# Patient Record
Sex: FEMALE | Race: WHITE | NOT HISPANIC OR LATINO | Employment: FULL TIME | URBAN - METROPOLITAN AREA
[De-identification: names, ages, dates, MRNs, and addresses within clinical notes are randomized per-mention and may not be internally consistent; named-entity substitution may affect disease eponyms.]

---

## 2019-08-16 ENCOUNTER — TRANSCRIBE ORDERS (OUTPATIENT)
Dept: ADMINISTRATIVE | Facility: HOSPITAL | Age: 29
End: 2019-08-16

## 2019-08-16 DIAGNOSIS — R10.11 RIGHT UPPER QUADRANT ABDOMINAL PAIN: Primary | ICD-10-CM

## 2019-08-22 ENCOUNTER — HOSPITAL ENCOUNTER (OUTPATIENT)
Dept: ULTRASOUND IMAGING | Facility: HOSPITAL | Age: 29
Discharge: HOME | End: 2019-08-22
Admitting: NURSE PRACTITIONER

## 2019-08-22 ENCOUNTER — HOSPITAL ENCOUNTER (OUTPATIENT)
Dept: NUCLEAR MEDICINE | Facility: HOSPITAL | Age: 29
Discharge: HOME | End: 2019-08-22

## 2019-08-22 DIAGNOSIS — R10.11 RIGHT UPPER QUADRANT ABDOMINAL PAIN: ICD-10-CM

## 2019-08-22 PROCEDURE — 0 TECHNETIUM SESTAMIBI: Performed by: NURSE PRACTITIONER

## 2019-08-22 PROCEDURE — 76705 ECHO EXAM OF ABDOMEN: CPT

## 2019-08-22 PROCEDURE — A9500 TC99M SESTAMIBI: HCPCS | Performed by: NURSE PRACTITIONER

## 2019-08-22 PROCEDURE — 78227 HEPATOBIL SYST IMAGE W/DRUG: CPT

## 2019-08-22 RX ADMIN — TECHNETIUM TC 99M SESTAMIBI 1 DOSE: 1 INJECTION INTRAVENOUS at 09:10

## 2019-08-29 ENCOUNTER — OFFICE (AMBULATORY)
Dept: URBAN - METROPOLITAN AREA CLINIC 64 | Facility: CLINIC | Age: 29
End: 2019-08-29

## 2019-08-29 VITALS
SYSTOLIC BLOOD PRESSURE: 115 MMHG | HEIGHT: 65 IN | HEART RATE: 80 BPM | WEIGHT: 178 LBS | DIASTOLIC BLOOD PRESSURE: 85 MMHG

## 2019-08-29 DIAGNOSIS — K21.9 GASTRO-ESOPHAGEAL REFLUX DISEASE WITHOUT ESOPHAGITIS: ICD-10-CM

## 2019-08-29 DIAGNOSIS — R10.11 RIGHT UPPER QUADRANT PAIN: ICD-10-CM

## 2019-08-29 DIAGNOSIS — R11.0 NAUSEA: ICD-10-CM

## 2019-08-29 DIAGNOSIS — R19.8 OTHER SPECIFIED SYMPTOMS AND SIGNS INVOLVING THE DIGESTIVE S: ICD-10-CM

## 2019-08-29 PROCEDURE — 99243 OFF/OP CNSLTJ NEW/EST LOW 30: CPT | Performed by: NURSE PRACTITIONER

## 2019-08-29 RX ORDER — SUCRALFATE 1 G/1
4 TABLET ORAL
Qty: 120 | Refills: 11 | Status: ACTIVE
Start: 2019-08-29

## 2019-09-06 ENCOUNTER — OFFICE (AMBULATORY)
Dept: URBAN - METROPOLITAN AREA PATHOLOGY 4 | Facility: PATHOLOGY | Age: 29
End: 2019-09-06

## 2019-09-06 ENCOUNTER — ON CAMPUS - OUTPATIENT (AMBULATORY)
Dept: URBAN - METROPOLITAN AREA HOSPITAL 2 | Facility: HOSPITAL | Age: 29
End: 2019-09-06

## 2019-09-06 VITALS
HEART RATE: 91 BPM | SYSTOLIC BLOOD PRESSURE: 136 MMHG | RESPIRATION RATE: 18 BRPM | DIASTOLIC BLOOD PRESSURE: 77 MMHG | OXYGEN SATURATION: 96 % | HEART RATE: 68 BPM | HEART RATE: 61 BPM | TEMPERATURE: 97.6 F | OXYGEN SATURATION: 97 % | OXYGEN SATURATION: 100 % | DIASTOLIC BLOOD PRESSURE: 78 MMHG | DIASTOLIC BLOOD PRESSURE: 84 MMHG | SYSTOLIC BLOOD PRESSURE: 144 MMHG | WEIGHT: 170 LBS | SYSTOLIC BLOOD PRESSURE: 115 MMHG | RESPIRATION RATE: 16 BRPM | HEART RATE: 73 BPM | DIASTOLIC BLOOD PRESSURE: 86 MMHG | SYSTOLIC BLOOD PRESSURE: 124 MMHG | DIASTOLIC BLOOD PRESSURE: 79 MMHG | SYSTOLIC BLOOD PRESSURE: 123 MMHG | HEIGHT: 64 IN

## 2019-09-06 DIAGNOSIS — K29.70 GASTRITIS, UNSPECIFIED, WITHOUT BLEEDING: ICD-10-CM

## 2019-09-06 DIAGNOSIS — R11.0 NAUSEA: ICD-10-CM

## 2019-09-06 DIAGNOSIS — K21.9 GASTRO-ESOPHAGEAL REFLUX DISEASE WITHOUT ESOPHAGITIS: ICD-10-CM

## 2019-09-06 DIAGNOSIS — R10.11 RIGHT UPPER QUADRANT PAIN: ICD-10-CM

## 2019-09-06 DIAGNOSIS — K29.50 UNSPECIFIED CHRONIC GASTRITIS WITHOUT BLEEDING: ICD-10-CM

## 2019-09-06 LAB
GI HISTOLOGY: A. SELECT: (no result)
GI HISTOLOGY: B. UNSPECIFIED: (no result)
GI HISTOLOGY: PDF REPORT: (no result)

## 2019-09-06 PROCEDURE — 88305 TISSUE EXAM BY PATHOLOGIST: CPT | Performed by: INTERNAL MEDICINE

## 2019-09-06 PROCEDURE — 88342 IMHCHEM/IMCYTCHM 1ST ANTB: CPT | Performed by: INTERNAL MEDICINE

## 2019-09-06 PROCEDURE — 43239 EGD BIOPSY SINGLE/MULTIPLE: CPT | Performed by: INTERNAL MEDICINE

## 2019-09-06 NOTE — SERVICEHPINOTES
FELICIA MONTOYA  is a  28  female   who presents today for a  EGD   for   the indications listed below. The updated Patient Profile was reviewed prior to the procedure, in conjunction with the Physical Exam, including medical conditions, surgical procedures, medications, allergies, family history and social history. See Physical Exam time stamp below for date and time of HPI completion.Pre-operatively, I reviewed the indication(s) for the procedure, the risks of the procedure [including but not limited to: unexpected bleeding possibly requiring hospitalization and/or unplanned repeat procedures, perforation possibly requiring surgical treatment, missed lesions and complications of sedation/MAC (also explained by anesthesia staff)]. I have evaluated the patient for risks associated with the planned anesthesia and the procedure to be performed and find the patient an acceptable candidate for IV sedation.Multiple opportunities were provided for any questions or concerns, and all questions were answered satisfactorily before any anesthesia was administered. We will proceed with the planned procedure.BR

## 2019-11-19 ENCOUNTER — OFFICE (AMBULATORY)
Dept: URBAN - METROPOLITAN AREA CLINIC 64 | Facility: CLINIC | Age: 29
End: 2019-11-19

## 2019-11-19 VITALS
HEART RATE: 90 BPM | DIASTOLIC BLOOD PRESSURE: 99 MMHG | WEIGHT: 194 LBS | HEIGHT: 64 IN | SYSTOLIC BLOOD PRESSURE: 139 MMHG

## 2019-11-19 DIAGNOSIS — K29.70 GASTRITIS, UNSPECIFIED, WITHOUT BLEEDING: ICD-10-CM

## 2019-11-19 DIAGNOSIS — R19.8 OTHER SPECIFIED SYMPTOMS AND SIGNS INVOLVING THE DIGESTIVE S: ICD-10-CM

## 2019-11-19 PROCEDURE — 99213 OFFICE O/P EST LOW 20 MIN: CPT | Performed by: NURSE PRACTITIONER

## 2020-12-07 ENCOUNTER — OFFICE (AMBULATORY)
Dept: URBAN - METROPOLITAN AREA CLINIC 64 | Facility: CLINIC | Age: 30
End: 2020-12-07

## 2020-12-07 VITALS
SYSTOLIC BLOOD PRESSURE: 132 MMHG | HEIGHT: 64 IN | WEIGHT: 202 LBS | HEART RATE: 82 BPM | DIASTOLIC BLOOD PRESSURE: 84 MMHG

## 2020-12-07 DIAGNOSIS — K59.00 CONSTIPATION, UNSPECIFIED: ICD-10-CM

## 2020-12-07 DIAGNOSIS — R10.11 RIGHT UPPER QUADRANT PAIN: ICD-10-CM

## 2020-12-07 DIAGNOSIS — R13.10 DYSPHAGIA, UNSPECIFIED: ICD-10-CM

## 2020-12-07 DIAGNOSIS — R11.0 NAUSEA: ICD-10-CM

## 2020-12-07 DIAGNOSIS — K21.9 GASTRO-ESOPHAGEAL REFLUX DISEASE WITHOUT ESOPHAGITIS: ICD-10-CM

## 2020-12-07 DIAGNOSIS — R10.84 GENERALIZED ABDOMINAL PAIN: ICD-10-CM

## 2020-12-07 PROCEDURE — 99213 OFFICE O/P EST LOW 20 MIN: CPT | Performed by: NURSE PRACTITIONER

## 2020-12-07 RX ORDER — PANTOPRAZOLE 40 MG/1
TABLET, DELAYED RELEASE ORAL
Qty: 90 | Refills: 3 | Status: ACTIVE

## 2020-12-10 ENCOUNTER — OFFICE (AMBULATORY)
Dept: URBAN - METROPOLITAN AREA PATHOLOGY 4 | Facility: PATHOLOGY | Age: 30
End: 2020-12-10

## 2020-12-10 ENCOUNTER — ON CAMPUS - OUTPATIENT (AMBULATORY)
Dept: URBAN - METROPOLITAN AREA HOSPITAL 2 | Facility: HOSPITAL | Age: 30
End: 2020-12-10
Payer: COMMERCIAL

## 2020-12-10 VITALS
HEIGHT: 64 IN | TEMPERATURE: 97.8 F | HEART RATE: 67 BPM | DIASTOLIC BLOOD PRESSURE: 79 MMHG | DIASTOLIC BLOOD PRESSURE: 76 MMHG | DIASTOLIC BLOOD PRESSURE: 88 MMHG | WEIGHT: 201 LBS | RESPIRATION RATE: 16 BRPM | SYSTOLIC BLOOD PRESSURE: 142 MMHG | HEART RATE: 69 BPM | SYSTOLIC BLOOD PRESSURE: 112 MMHG | SYSTOLIC BLOOD PRESSURE: 117 MMHG | SYSTOLIC BLOOD PRESSURE: 111 MMHG | SYSTOLIC BLOOD PRESSURE: 110 MMHG | SYSTOLIC BLOOD PRESSURE: 103 MMHG | HEART RATE: 73 BPM | OXYGEN SATURATION: 100 % | DIASTOLIC BLOOD PRESSURE: 68 MMHG | DIASTOLIC BLOOD PRESSURE: 85 MMHG | OXYGEN SATURATION: 99 % | RESPIRATION RATE: 18 BRPM | SYSTOLIC BLOOD PRESSURE: 121 MMHG | HEART RATE: 76 BPM | HEART RATE: 66 BPM | HEART RATE: 72 BPM | HEART RATE: 74 BPM | RESPIRATION RATE: 15 BRPM | DIASTOLIC BLOOD PRESSURE: 59 MMHG

## 2020-12-10 DIAGNOSIS — R11.0 NAUSEA: ICD-10-CM

## 2020-12-10 DIAGNOSIS — K21.9 GASTRO-ESOPHAGEAL REFLUX DISEASE WITHOUT ESOPHAGITIS: ICD-10-CM

## 2020-12-10 DIAGNOSIS — K62.1 RECTAL POLYP: ICD-10-CM

## 2020-12-10 DIAGNOSIS — K52.9 NONINFECTIVE GASTROENTERITIS AND COLITIS, UNSPECIFIED: ICD-10-CM

## 2020-12-10 DIAGNOSIS — R13.10 DYSPHAGIA, UNSPECIFIED: ICD-10-CM

## 2020-12-10 DIAGNOSIS — K59.00 CONSTIPATION, UNSPECIFIED: ICD-10-CM

## 2020-12-10 DIAGNOSIS — R10.84 GENERALIZED ABDOMINAL PAIN: ICD-10-CM

## 2020-12-10 DIAGNOSIS — K63.89 OTHER SPECIFIED DISEASES OF INTESTINE: ICD-10-CM

## 2020-12-10 LAB
GI HISTOLOGY: A. UNSPECIFIED: (no result)
GI HISTOLOGY: B. UNSPECIFIED: (no result)
GI HISTOLOGY: PDF REPORT: (no result)

## 2020-12-10 PROCEDURE — 88305 TISSUE EXAM BY PATHOLOGIST: CPT | Performed by: INTERNAL MEDICINE

## 2020-12-10 PROCEDURE — 43235 EGD DIAGNOSTIC BRUSH WASH: CPT | Performed by: INTERNAL MEDICINE

## 2020-12-10 PROCEDURE — 43450 DILATE ESOPHAGUS 1/MULT PASS: CPT | Performed by: INTERNAL MEDICINE

## 2020-12-10 PROCEDURE — 45380 COLONOSCOPY AND BIOPSY: CPT | Performed by: INTERNAL MEDICINE

## 2020-12-10 RX ORDER — DICYCLOMINE HYDROCHLORIDE 20 MG/1
60 TABLET ORAL
Qty: 90 | Refills: 11 | Status: COMPLETED
Start: 2020-12-10 | End: 2021-03-08

## 2021-03-08 ENCOUNTER — OFFICE (AMBULATORY)
Dept: URBAN - METROPOLITAN AREA CLINIC 64 | Facility: CLINIC | Age: 31
End: 2021-03-08

## 2021-03-08 VITALS
HEART RATE: 82 BPM | DIASTOLIC BLOOD PRESSURE: 74 MMHG | WEIGHT: 216 LBS | SYSTOLIC BLOOD PRESSURE: 115 MMHG | HEIGHT: 64 IN

## 2021-03-08 DIAGNOSIS — K59.00 CONSTIPATION, UNSPECIFIED: ICD-10-CM

## 2021-03-08 DIAGNOSIS — R10.11 RIGHT UPPER QUADRANT PAIN: ICD-10-CM

## 2021-03-08 DIAGNOSIS — K21.9 GASTRO-ESOPHAGEAL REFLUX DISEASE WITHOUT ESOPHAGITIS: ICD-10-CM

## 2021-03-08 DIAGNOSIS — R11.0 NAUSEA: ICD-10-CM

## 2021-03-08 PROCEDURE — 99212 OFFICE O/P EST SF 10 MIN: CPT | Performed by: NURSE PRACTITIONER

## 2021-06-03 ENCOUNTER — OFFICE (AMBULATORY)
Dept: URBAN - METROPOLITAN AREA CLINIC 64 | Facility: CLINIC | Age: 31
End: 2021-06-03

## 2021-06-03 VITALS
HEIGHT: 64 IN | DIASTOLIC BLOOD PRESSURE: 82 MMHG | WEIGHT: 240 LBS | SYSTOLIC BLOOD PRESSURE: 127 MMHG | HEART RATE: 69 BPM

## 2021-06-03 DIAGNOSIS — R14.0 ABDOMINAL DISTENSION (GASEOUS): ICD-10-CM

## 2021-06-03 DIAGNOSIS — K59.00 CONSTIPATION, UNSPECIFIED: ICD-10-CM

## 2021-06-03 DIAGNOSIS — R11.0 NAUSEA: ICD-10-CM

## 2021-06-03 DIAGNOSIS — R10.11 RIGHT UPPER QUADRANT PAIN: ICD-10-CM

## 2021-06-03 PROCEDURE — 99212 OFFICE O/P EST SF 10 MIN: CPT | Performed by: NURSE PRACTITIONER

## 2021-12-06 ENCOUNTER — OFFICE (AMBULATORY)
Dept: URBAN - METROPOLITAN AREA CLINIC 64 | Facility: CLINIC | Age: 31
End: 2021-12-06
Payer: COMMERCIAL

## 2021-12-06 VITALS
HEART RATE: 70 BPM | HEIGHT: 64 IN | SYSTOLIC BLOOD PRESSURE: 127 MMHG | WEIGHT: 227 LBS | DIASTOLIC BLOOD PRESSURE: 78 MMHG

## 2021-12-06 DIAGNOSIS — R10.9 UNSPECIFIED ABDOMINAL PAIN: ICD-10-CM

## 2021-12-06 PROCEDURE — 99212 OFFICE O/P EST SF 10 MIN: CPT | Performed by: NURSE PRACTITIONER

## 2022-06-15 ENCOUNTER — OFFICE VISIT (OUTPATIENT)
Dept: FAMILY MEDICINE CLINIC | Facility: CLINIC | Age: 32
End: 2022-06-15

## 2022-06-15 VITALS
HEIGHT: 64 IN | OXYGEN SATURATION: 99 % | BODY MASS INDEX: 39.78 KG/M2 | DIASTOLIC BLOOD PRESSURE: 80 MMHG | SYSTOLIC BLOOD PRESSURE: 124 MMHG | WEIGHT: 233 LBS | HEART RATE: 80 BPM

## 2022-06-15 DIAGNOSIS — F32.A ANXIETY AND DEPRESSION: ICD-10-CM

## 2022-06-15 DIAGNOSIS — F90.2 ATTENTION DEFICIT HYPERACTIVITY DISORDER (ADHD), COMBINED TYPE: ICD-10-CM

## 2022-06-15 DIAGNOSIS — L70.9 ACNE, UNSPECIFIED ACNE TYPE: ICD-10-CM

## 2022-06-15 DIAGNOSIS — K58.1 IRRITABLE BOWEL SYNDROME WITH CONSTIPATION: ICD-10-CM

## 2022-06-15 DIAGNOSIS — L40.0 PLAQUE PSORIASIS: ICD-10-CM

## 2022-06-15 DIAGNOSIS — R73.03 PREDIABETES: ICD-10-CM

## 2022-06-15 DIAGNOSIS — Z00.00 PREVENTATIVE HEALTH CARE: Primary | ICD-10-CM

## 2022-06-15 DIAGNOSIS — F41.9 ANXIETY AND DEPRESSION: ICD-10-CM

## 2022-06-15 PROCEDURE — 80050 GENERAL HEALTH PANEL: CPT | Performed by: NURSE PRACTITIONER

## 2022-06-15 PROCEDURE — 99204 OFFICE O/P NEW MOD 45 MIN: CPT | Performed by: NURSE PRACTITIONER

## 2022-06-15 RX ORDER — DOXYCYCLINE 40 MG/1
CAPSULE ORAL EVERY MORNING
COMMUNITY

## 2022-06-15 RX ORDER — DIPHENOXYLATE HYDROCHLORIDE AND ATROPINE SULFATE 2.5; .025 MG/1; MG/1
1 TABLET ORAL DAILY
COMMUNITY

## 2022-06-15 RX ORDER — CETIRIZINE HYDROCHLORIDE 10 MG/1
10 TABLET ORAL DAILY
COMMUNITY

## 2022-06-15 RX ORDER — PROPRANOLOL HYDROCHLORIDE 40 MG/1
TABLET ORAL
COMMUNITY
Start: 2022-05-20 | End: 2022-07-06

## 2022-06-15 RX ORDER — CYANOCOBALAMIN 500 UG/1
SPRAY NASAL
COMMUNITY

## 2022-06-15 RX ORDER — VORTIOXETINE 10 MG/1
TABLET, FILM COATED ORAL
COMMUNITY
End: 2022-10-24 | Stop reason: SDUPTHER

## 2022-06-15 NOTE — ASSESSMENT & PLAN NOTE
1.  Continue working on diet and exercise  2.  Discussed Wegovy and Saxenda if patient has difficulty with weight loss

## 2022-06-15 NOTE — ASSESSMENT & PLAN NOTE
1.  Continue doxycycline  2.  If dizziness resolves when propanolol is stopped, consider restarting Aldactone

## 2022-06-15 NOTE — PROGRESS NOTES
Chief Complaint  Establish Care and Annual Exam    Subjective        Binta Frazier presents to [facility]  History of Present Illness    Patient presents to [facility] for annual exam.     Patienet also has IBS with constipation. Patient is on Linzess, taking as prescribed. She does also have a gluten sensitivity. Last colonoscopy 2021, found polyps and erosion in large intestine. Patient will repeat in 1 year. She is followed by [facility].     Patient is taking Trintellix for Depression and Propranolol 40mg daily for anxiety. Patient reports symptoms are well controlled. She does have intermittent dizziness and fatigue, concerned with ongoing side effects from Propranolol. Patient is taking Vyvanse for ADHD, symptoms are stable.     Patient is taking Doxycycline for rosacea. Symptoms well controlled. Patient was previously on aldactone for hormonal acne but stopped taking medication due to ongoing dizziness.    Patient has plaque psoriasis, followed by [facility].  She is taking Enbrel injections.  Patient does have some joint pain, bilateral knees and elbows. Patient is not followed by Rheumatology.    A1C was recently 5.8, prediabetes. Patient was started on Contrave by other PCP for weight loss but did not tolerate side effects well. Patient is working with dietician and is exercising.     PHQ-9 Depression Screening  Little interest or pleasure in doing things? 0-->not at all   Feeling down, depressed, or hopeless? 0-->not at all   Trouble falling or staying asleep, or sleeping too much?     Feeling tired or having little energy?     Poor appetite or overeating?     Feeling bad about yourself - or that you are a failure or have let yourself or your family down?     Trouble concentrating on things, such as reading the newspaper or watching television?     Moving or speaking so slowly that other people could have noticed? Or the opposite - being so fidgety or restless that  "you have been moving around a lot more than usual?     Thoughts that you would be better off dead, or of hurting yourself in some way?     PHQ-9 Total Score 0   If you checked off any problems, how difficult have these problems made it for you to do your work, take care of things at home, or get along with other people?           Objective   Vital Signs:  /80 (BP Location: Left arm, Patient Position: Sitting, Cuff Size: Large Adult)   Pulse 80   Ht 162.6 cm (64\")   Wt 106 kg (233 lb)   SpO2 99%   BMI 39.99 kg/m²   Estimated body mass index is 39.99 kg/m² as calculated from the following:    Height as of this encounter: 162.6 cm (64\").    Weight as of this encounter: 106 kg (233 lb).       Physical Exam  Constitutional:       Appearance: Normal appearance.   HENT:      Head: Normocephalic.   Cardiovascular:      Rate and Rhythm: Normal rate and regular rhythm.   Pulmonary:      Effort: Pulmonary effort is normal.      Breath sounds: Normal breath sounds.   Abdominal:      General: Abdomen is flat. Bowel sounds are normal.      Palpations: Abdomen is soft.   Musculoskeletal:         General: Normal range of motion.      Cervical back: Neck supple.      Right lower leg: No edema.      Left lower leg: No edema.   Skin:     General: Skin is warm and dry.   Neurological:      Mental Status: She is alert and oriented to person, place, and time.      Gait: Gait is intact.   Psychiatric:         Attention and Perception: Attention normal.         Mood and Affect: Mood normal.         Speech: Speech normal.        Result Review :                Assessment and Plan   Diagnoses and all orders for this visit:    1. Preventative health care (Primary)  Assessment & Plan:  1.  Patient brought copy of lab work from January 2022, reviewed during visit    Orders:  -     CBC & Differential  -     Comprehensive Metabolic Panel  -     TSH    2. Attention deficit hyperactivity disorder (ADHD), combined type  Assessment & " Plan:  1.  Stable  2.  Continue Vyvanse as prescribed      3. Anxiety and depression  Assessment & Plan:  1.  Continue Trintellix as prescribed  2.  Wean off of propanolol, schedule provided  3.  If dizziness and fatigue resolve but anxiety increases, we will discuss other options at that time      4. Plaque psoriasis  Assessment & Plan:  1.  Continue Enbrel  2.  Follow-up with dermatology      5. Prediabetes  Assessment & Plan:  1.  Continue working on diet and exercise  2.  Discussed Wegovy and Saxenda if patient has difficulty with weight loss      6. Acne, unspecified acne type  Assessment & Plan:  1.  Continue doxycycline  2.  If dizziness resolves when propanolol is stopped, consider restarting Aldactone      7. Irritable bowel syndrome with constipation  Assessment & Plan:  1.  Continue Linzess as prescribed  2.  Follow-up with GI           I spent 40 minutes caring for Binta on this date of service. This time includes time spent by me in the following activities:preparing for the visit, reviewing tests, obtaining and/or reviewing a separately obtained history, performing a medically appropriate examination and/or evaluation , counseling and educating the patient/family/caregiver, ordering medications, tests, or procedures, documenting information in the medical record, independently interpreting results and communicating that information with the patient/family/caregiver and care coordination  Follow Up   Return in about 4 weeks (around 7/13/2022) for Dizziness/Anxiety.  Patient was given instructions and counseling regarding her condition or for health maintenance advice. Please see specific information pulled into the AVS if appropriate.

## 2022-06-15 NOTE — ASSESSMENT & PLAN NOTE
1.  Continue Trintellix as prescribed  2.  Wean off of propanolol, schedule provided  3.  If dizziness and fatigue resolve but anxiety increases, we will discuss other options at that time

## 2022-06-16 LAB
ALBUMIN SERPL-MCNC: 4.3 G/DL (ref 3.5–5.2)
ALBUMIN/GLOB SERPL: 1.8 G/DL
ALP SERPL-CCNC: 124 U/L (ref 39–117)
ALT SERPL W P-5'-P-CCNC: 16 U/L (ref 1–33)
ANION GAP SERPL CALCULATED.3IONS-SCNC: 10 MMOL/L (ref 5–15)
AST SERPL-CCNC: 12 U/L (ref 1–32)
BASOPHILS # BLD AUTO: 0.07 10*3/MM3 (ref 0–0.2)
BASOPHILS NFR BLD AUTO: 0.8 % (ref 0–1.5)
BILIRUB SERPL-MCNC: 0.3 MG/DL (ref 0–1.2)
BUN SERPL-MCNC: 14 MG/DL (ref 6–20)
BUN/CREAT SERPL: 18.7 (ref 7–25)
CALCIUM SPEC-SCNC: 9.2 MG/DL (ref 8.6–10.5)
CHLORIDE SERPL-SCNC: 104 MMOL/L (ref 98–107)
CO2 SERPL-SCNC: 25 MMOL/L (ref 22–29)
CREAT SERPL-MCNC: 0.75 MG/DL (ref 0.57–1)
DEPRECATED RDW RBC AUTO: 37 FL (ref 37–54)
EGFRCR SERPLBLD CKD-EPI 2021: 108 ML/MIN/1.73
EOSINOPHIL # BLD AUTO: 0.32 10*3/MM3 (ref 0–0.4)
EOSINOPHIL NFR BLD AUTO: 3.7 % (ref 0.3–6.2)
ERYTHROCYTE [DISTWIDTH] IN BLOOD BY AUTOMATED COUNT: 12.7 % (ref 12.3–15.4)
GLOBULIN UR ELPH-MCNC: 2.4 GM/DL
GLUCOSE SERPL-MCNC: 112 MG/DL (ref 65–99)
HCT VFR BLD AUTO: 38.7 % (ref 34–46.6)
HGB BLD-MCNC: 12.9 G/DL (ref 12–15.9)
IMM GRANULOCYTES # BLD AUTO: 0.05 10*3/MM3 (ref 0–0.05)
IMM GRANULOCYTES NFR BLD AUTO: 0.6 % (ref 0–0.5)
LYMPHOCYTES # BLD AUTO: 2.64 10*3/MM3 (ref 0.7–3.1)
LYMPHOCYTES NFR BLD AUTO: 30.8 % (ref 19.6–45.3)
MCH RBC QN AUTO: 27.6 PG (ref 26.6–33)
MCHC RBC AUTO-ENTMCNC: 33.3 G/DL (ref 31.5–35.7)
MCV RBC AUTO: 82.7 FL (ref 79–97)
MONOCYTES # BLD AUTO: 0.55 10*3/MM3 (ref 0.1–0.9)
MONOCYTES NFR BLD AUTO: 6.4 % (ref 5–12)
NEUTROPHILS NFR BLD AUTO: 4.95 10*3/MM3 (ref 1.7–7)
NEUTROPHILS NFR BLD AUTO: 57.7 % (ref 42.7–76)
NRBC BLD AUTO-RTO: 0 /100 WBC (ref 0–0.2)
PLATELET # BLD AUTO: 316 10*3/MM3 (ref 140–450)
PMV BLD AUTO: 9.6 FL (ref 6–12)
POTASSIUM SERPL-SCNC: 4.6 MMOL/L (ref 3.5–5.2)
PROT SERPL-MCNC: 6.7 G/DL (ref 6–8.5)
RBC # BLD AUTO: 4.68 10*6/MM3 (ref 3.77–5.28)
SODIUM SERPL-SCNC: 139 MMOL/L (ref 136–145)
TSH SERPL DL<=0.05 MIU/L-ACNC: 1.81 UIU/ML (ref 0.27–4.2)
WBC NRBC COR # BLD: 8.58 10*3/MM3 (ref 3.4–10.8)

## 2022-06-28 ENCOUNTER — OFFICE (AMBULATORY)
Dept: URBAN - METROPOLITAN AREA CLINIC 64 | Facility: CLINIC | Age: 32
End: 2022-06-28

## 2022-06-28 VITALS
HEART RATE: 84 BPM | HEIGHT: 64 IN | SYSTOLIC BLOOD PRESSURE: 123 MMHG | WEIGHT: 237 LBS | DIASTOLIC BLOOD PRESSURE: 79 MMHG

## 2022-06-28 DIAGNOSIS — K21.9 GASTRO-ESOPHAGEAL REFLUX DISEASE WITHOUT ESOPHAGITIS: ICD-10-CM

## 2022-06-28 DIAGNOSIS — K58.0 IRRITABLE BOWEL SYNDROME WITH DIARRHEA: ICD-10-CM

## 2022-06-28 PROCEDURE — 99213 OFFICE O/P EST LOW 20 MIN: CPT | Performed by: NURSE PRACTITIONER

## 2022-07-06 ENCOUNTER — OFFICE VISIT (OUTPATIENT)
Dept: FAMILY MEDICINE CLINIC | Facility: CLINIC | Age: 32
End: 2022-07-06

## 2022-07-06 VITALS
HEIGHT: 64 IN | WEIGHT: 232 LBS | HEART RATE: 88 BPM | OXYGEN SATURATION: 100 % | DIASTOLIC BLOOD PRESSURE: 100 MMHG | SYSTOLIC BLOOD PRESSURE: 125 MMHG | BODY MASS INDEX: 39.61 KG/M2

## 2022-07-06 DIAGNOSIS — R42 DIZZINESS: ICD-10-CM

## 2022-07-06 DIAGNOSIS — M79.89 LEG SWELLING: ICD-10-CM

## 2022-07-06 DIAGNOSIS — R20.0 NUMBNESS AND TINGLING OF BOTH FEET: Primary | ICD-10-CM

## 2022-07-06 DIAGNOSIS — R63.5 WEIGHT GAIN: ICD-10-CM

## 2022-07-06 DIAGNOSIS — M30.0 POLYARTERITIS NODOSA: ICD-10-CM

## 2022-07-06 DIAGNOSIS — R20.2 NUMBNESS AND TINGLING OF BOTH FEET: Primary | ICD-10-CM

## 2022-07-06 PROCEDURE — 80048 BASIC METABOLIC PNL TOTAL CA: CPT | Performed by: NURSE PRACTITIONER

## 2022-07-06 PROCEDURE — 99214 OFFICE O/P EST MOD 30 MIN: CPT | Performed by: NURSE PRACTITIONER

## 2022-07-06 PROCEDURE — 82607 VITAMIN B-12: CPT | Performed by: NURSE PRACTITIONER

## 2022-07-06 PROCEDURE — 83735 ASSAY OF MAGNESIUM: CPT | Performed by: NURSE PRACTITIONER

## 2022-07-06 PROCEDURE — 83880 ASSAY OF NATRIURETIC PEPTIDE: CPT | Performed by: NURSE PRACTITIONER

## 2022-07-06 PROCEDURE — 82533 TOTAL CORTISOL: CPT | Performed by: NURSE PRACTITIONER

## 2022-07-06 RX ORDER — SPIRONOLACTONE 50 MG/1
TABLET, FILM COATED ORAL
COMMUNITY
Start: 2022-06-04 | End: 2022-07-06

## 2022-07-06 RX ORDER — DAPSONE 75 MG/G
GEL TOPICAL
COMMUNITY
Start: 2018-06-11

## 2022-07-06 RX ORDER — PANTOPRAZOLE SODIUM 40 MG/1
TABLET, DELAYED RELEASE ORAL
COMMUNITY
Start: 2022-06-04

## 2022-07-06 NOTE — PROGRESS NOTES
"Chief Complaint  Dizziness (1MFU), Numbness, and Tingling (Toes and fingers turning purple, not cold)    Subjective        Binta Frazier presents to [facility]  History of Present Illness    Patient presents with c/o increasing dizziness, worse with repositioning but occurs when seated as well. No known aggravating factors. She also reports intermittent swelling to bilateral hands and feet. Patient describing pain to feet(burning to numbness), also noting a \"purple-blue color\" at the end of the day. She is having intermittent numbness/tingling to bilateral arms and feet. Patient reports her blood pressure at home is normal, ranging 116-130/70-80. Patient reports that a few years ago, she was sent to Rheumatology regarding similar symptoms mentioned above. She was found to have a PAN marker, but Rheumatology chose not to proceed with workup at that time. Patient does have generalized joint pain. She also reports weight gain over the last year, without known cause.     Objective   Vital Signs:  /100   Pulse 88   Ht 162.6 cm (64\")   Wt 105 kg (232 lb)   SpO2 100%   BMI 39.82 kg/m²   Estimated body mass index is 39.82 kg/m² as calculated from the following:    Height as of this encounter: 162.6 cm (64\").    Weight as of this encounter: 105 kg (232 lb).          Physical Exam  Constitutional:       Appearance: Normal appearance.   HENT:      Head: Normocephalic.   Cardiovascular:      Rate and Rhythm: Normal rate and regular rhythm.      Pulses: Normal pulses.   Pulmonary:      Effort: Pulmonary effort is normal.      Breath sounds: Normal breath sounds.   Abdominal:      General: Abdomen is flat. Bowel sounds are normal.      Palpations: Abdomen is soft.   Musculoskeletal:         General: Normal range of motion.      Cervical back: Neck supple.      Right lower le+ Edema present.      Left lower le+ Edema present.   Skin:     General: Skin is warm and dry. "   Neurological:      Mental Status: She is alert and oriented to person, place, and time.      Gait: Gait is intact.   Psychiatric:         Attention and Perception: Attention normal.         Mood and Affect: Mood normal.         Speech: Speech normal.        Result Review :    CMP    CMP 6/15/22   Glucose 112 (A)   BUN 14   Creatinine 0.75   Sodium 139   Potassium 4.6   Chloride 104   Calcium 9.2   Albumin 4.30   Total Bilirubin 0.3   Alkaline Phosphatase 124 (A)   AST (SGOT) 12   ALT (SGPT) 16   (A) Abnormal value            CBC    CBC 6/15/22   WBC 8.58   RBC 4.68   Hemoglobin 12.9   Hematocrit 38.7   MCV 82.7   MCH 27.6   MCHC 33.3   RDW 12.7   Platelets 316               TSH    TSH 6/15/22   TSH 1.810                         Assessment and Plan   Diagnoses and all orders for this visit:    1. Numbness and tingling of both feet (Primary)  Assessment & Plan:  1. Check labs  2. Consider EMG testing  3. Consider Neurology referral    Orders:  -     Doppler Ankle Brachial Index Single Level CAR; Future  -     Vitamin B12  -     Magnesium    2. Leg swelling  Assessment & Plan:  1. Check labs  2. Low salt diet  3. Increase water intake  4. Check GISELLE  5. Consider EMG testing    Orders:  -     Doppler Ankle Brachial Index Single Level CAR; Future  -     BNP    3. Polyarteritis nodosa (HCC)  Assessment & Plan:  1. Refer to Rheumatology for further evaluation d/t systemic symptoms    Orders:  -     Ambulatory Referral to Rheumatology    4. Dizziness  Assessment & Plan:  1. Orthostatics unremarkable but diastolic BP remains elevated  2. Patient to check daily and record, send readings in one week  3. Call if consistently > 140/90  4. If WNL, patient to f/u with ENT    Orders:  -     Basic Metabolic Panel    5. Weight gain  -     Cortisol         I spent 30 minutes caring for Binta on this date of service. This time includes time spent by me in the following activities:preparing for the visit, reviewing tests, obtaining  and/or reviewing a separately obtained history, performing a medically appropriate examination and/or evaluation , counseling and educating the patient/family/caregiver, ordering medications, tests, or procedures, referring and communicating with other health care professionals , documenting information in the medical record, independently interpreting results and communicating that information with the patient/family/caregiver and care coordination  Follow Up   Return in about 3 months (around 10/6/2022) for Dizziness.  Patient was given instructions and counseling regarding her condition or for health maintenance advice. Please see specific information pulled into the AVS if appropriate.       Answers for HPI/ROS submitted by the patient on 7/2/2022  Please describe your symptoms.: Light headed/dizzy - BP is normal range during these times; feet swelling, hurting, and toes turning purple; hands starting to swelling; deep chest cough  Have you had these symptoms before?: Yes  How long have you been having these symptoms?: 5-7 days  Please describe any probable cause for these symptoms. : Positive blood indicator for PAN.  What is the primary reason for your visit?: Other

## 2022-07-06 NOTE — ASSESSMENT & PLAN NOTE
1. Check labs  2. Low salt diet  3. Increase water intake  4. Check GISELLE  5. Consider EMG testing

## 2022-07-06 NOTE — ASSESSMENT & PLAN NOTE
1. Orthostatics unremarkable but diastolic BP remains elevated  2. Patient to check daily and record, send readings in one week  3. Call if consistently > 140/90  4. If WNL, patient to f/u with ENT

## 2022-07-07 LAB
ANION GAP SERPL CALCULATED.3IONS-SCNC: 10 MMOL/L (ref 5–15)
BUN SERPL-MCNC: 13 MG/DL (ref 6–20)
BUN/CREAT SERPL: 18.3 (ref 7–25)
CALCIUM SPEC-SCNC: 9 MG/DL (ref 8.6–10.5)
CHLORIDE SERPL-SCNC: 102 MMOL/L (ref 98–107)
CO2 SERPL-SCNC: 24 MMOL/L (ref 22–29)
CORTIS SERPL-MCNC: 7.1 MCG/DL
CREAT SERPL-MCNC: 0.71 MG/DL (ref 0.57–1)
EGFRCR SERPLBLD CKD-EPI 2021: 115.3 ML/MIN/1.73
GLUCOSE SERPL-MCNC: 106 MG/DL (ref 65–99)
MAGNESIUM SERPL-MCNC: 2.1 MG/DL (ref 1.6–2.6)
NT-PROBNP SERPL-MCNC: 48.7 PG/ML (ref 0–450)
POTASSIUM SERPL-SCNC: 3.9 MMOL/L (ref 3.5–5.2)
SODIUM SERPL-SCNC: 136 MMOL/L (ref 136–145)
VIT B12 BLD-MCNC: 944 PG/ML (ref 211–946)

## 2022-07-07 NOTE — PROGRESS NOTES
Vitamin B12 level is normal.  BNP level is normal, meaning no fluid overload.  Cortisol level was normal.  Glucose slightly elevated at 106, an expected finding midday as long as patient had eaten.  Magnesium level also normal.  I would like to check the GISELLE.  If normal, we will proceed with EMG testing.  Proceed with appointment with rheumatology

## 2022-07-20 ENCOUNTER — TELEPHONE (OUTPATIENT)
Dept: FAMILY MEDICINE CLINIC | Facility: CLINIC | Age: 32
End: 2022-07-20

## 2022-07-20 NOTE — TELEPHONE ENCOUNTER
Patient has lower extremity swelling as well as numbness and tingling to her feet.  This is not a screening -it is diagnostic imaging based on symptoms mentioned

## 2022-07-20 NOTE — TELEPHONE ENCOUNTER
"Lower extremity imaging ordered on 7/6/22 denied due to following:    \"Screening for asymptomatic peripheral arterial disease (PAD) has not been shown to affect outcomes and is therefore not considered medically necessary.\"  "

## 2022-07-21 ENCOUNTER — OFFICE (AMBULATORY)
Dept: URBAN - METROPOLITAN AREA CLINIC 64 | Facility: CLINIC | Age: 32
End: 2022-07-21

## 2022-07-21 ENCOUNTER — TELEPHONE (OUTPATIENT)
Dept: FAMILY MEDICINE CLINIC | Facility: CLINIC | Age: 32
End: 2022-07-21

## 2022-07-21 VITALS
HEART RATE: 82 BPM | DIASTOLIC BLOOD PRESSURE: 81 MMHG | HEIGHT: 64 IN | WEIGHT: 237 LBS | SYSTOLIC BLOOD PRESSURE: 129 MMHG

## 2022-07-21 DIAGNOSIS — R10.9 UNSPECIFIED ABDOMINAL PAIN: ICD-10-CM

## 2022-07-21 DIAGNOSIS — R14.0 ABDOMINAL DISTENSION (GASEOUS): ICD-10-CM

## 2022-07-21 PROCEDURE — 99213 OFFICE O/P EST LOW 20 MIN: CPT | Performed by: NURSE PRACTITIONER

## 2022-07-21 NOTE — TELEPHONE ENCOUNTER
Caller: RYLEE    Relationship to patient: [facility]    Best call back number: [phone]    Patient is needing: THEY RECEIVED THE [facility] PAPERWORK THAT WAS FAXED OVER BUT IT IS MISSING QUESTION #6 - TREATMENT.  THEY NEED IT ANSWERED YES OR NO AND IF IT IS YES THEY NEED TO KNOW THE FREQUENCY AND DURATION.  THEY ALSO FAXED THIS OVER.  IF YOU DID NOT GET IT PLEASE CALL AND ADVISE SO SHE CAN REFAX IT.

## 2022-07-28 ENCOUNTER — HOSPITAL ENCOUNTER (OUTPATIENT)
Dept: CARDIOLOGY | Facility: HOSPITAL | Age: 32
Discharge: HOME | End: 2022-07-28
Admitting: NURSE PRACTITIONER

## 2022-07-28 DIAGNOSIS — M79.89 LEG SWELLING: ICD-10-CM

## 2022-07-28 DIAGNOSIS — R20.0 NUMBNESS AND TINGLING OF BOTH FEET: ICD-10-CM

## 2022-07-28 DIAGNOSIS — R20.2 NUMBNESS AND TINGLING OF BOTH FEET: ICD-10-CM

## 2022-07-28 LAB
BH CV LOWER ARTERIAL LEFT ABI RATIO: 1.02
BH CV LOWER ARTERIAL LEFT DORSALIS PEDIS SYS MAX: 125
BH CV LOWER ARTERIAL LEFT GREAT TOE SYS MAX: 99
BH CV LOWER ARTERIAL LEFT POST TIBIAL SYS MAX: 133
BH CV LOWER ARTERIAL LEFT TBI RATIO: 0.76
BH CV LOWER ARTERIAL RIGHT ABI RATIO: 1.17
BH CV LOWER ARTERIAL RIGHT DORSALIS PEDIS SYS MAX: 134
BH CV LOWER ARTERIAL RIGHT GREAT TOE SYS MAX: 91
BH CV LOWER ARTERIAL RIGHT POST TIBIAL SYS MAX: 152
BH CV LOWER ARTERIAL RIGHT TBI RATIO: 0.7
MAXIMAL PREDICTED HEART RATE: 187 BPM
STRESS TARGET HR: 159 BPM
UPPER ARTERIAL LEFT ARM BRACHIAL SYS MAX: 127 MMHG
UPPER ARTERIAL RIGHT ARM BRACHIAL SYS MAX: 130 MMHG

## 2022-07-28 PROCEDURE — 93922 UPR/L XTREMITY ART 2 LEVELS: CPT

## 2022-08-18 DIAGNOSIS — F90.2 ATTENTION DEFICIT HYPERACTIVITY DISORDER (ADHD), COMBINED TYPE: Primary | ICD-10-CM

## 2022-08-18 NOTE — TELEPHONE ENCOUNTER
Caller: Binta Frazier    Relationship: Self    Best call back number: [phone] (H)    Requested Prescriptions:   Requested Prescriptions     Pending Prescriptions Disp Refills   • lisdexamfetamine (VYVANSE) 40 MG capsule          Pharmacy where request should be sent: [facility] [address] [phone] [facility] [phone]      Additional details provided by patient: PATIENT HAS BEEN OUT FOR 3 WEEKS       Does the patient have less than a 3 day supply:  [x] Yes  [] No    Bonnie Farnsworth [facility]   08/18/22 09:19 EDT

## 2022-09-28 DIAGNOSIS — F90.2 ATTENTION DEFICIT HYPERACTIVITY DISORDER (ADHD), COMBINED TYPE: ICD-10-CM

## 2022-09-28 NOTE — TELEPHONE ENCOUNTER
Caller: Binta Frazier    Relationship: Self    Best call back number:[phone]  Requested Prescriptions:   Requested Prescriptions     Pending Prescriptions Disp Refills   • lisdexamfetamine (VYVANSE) 40 MG capsule 30 capsule 0     Sig: Take 1 capsule by mouth Every Morning for 30 days        Pharmacy where request should be sent: [place], [place] - [address] [phone] [place] [phone]      Additional details provided by patient: WILL NEED A NEW PRESCRIPTION SENT TO PHARMACY   Does the patient have less than a 3 day supply:  [] Yes  [] No    Sylvia Cross Rep   09/28/22 13:40 EDT         DELETE AFTER READING TO PATIENT: “Thank you for sharing this information with me. I will send a message to the clinical team. Please allow 48 hours for the clinical staff to follow up on this request.”

## 2022-10-06 ENCOUNTER — OFFICE VISIT (OUTPATIENT)
Dept: FAMILY MEDICINE CLINIC | Facility: CLINIC | Age: 32
End: 2022-10-06

## 2022-10-06 VITALS
WEIGHT: 236 LBS | BODY MASS INDEX: 40.29 KG/M2 | OXYGEN SATURATION: 99 % | HEART RATE: 95 BPM | SYSTOLIC BLOOD PRESSURE: 124 MMHG | HEIGHT: 64 IN | DIASTOLIC BLOOD PRESSURE: 80 MMHG

## 2022-10-06 DIAGNOSIS — R73.03 PREDIABETES: ICD-10-CM

## 2022-10-06 DIAGNOSIS — F41.9 ANXIETY AND DEPRESSION: ICD-10-CM

## 2022-10-06 DIAGNOSIS — R20.2 NUMBNESS AND TINGLING OF BOTH FEET: ICD-10-CM

## 2022-10-06 DIAGNOSIS — F90.2 ATTENTION DEFICIT HYPERACTIVITY DISORDER (ADHD), COMBINED TYPE: ICD-10-CM

## 2022-10-06 DIAGNOSIS — R05.8 DRY COUGH: ICD-10-CM

## 2022-10-06 DIAGNOSIS — L40.0 PLAQUE PSORIASIS: ICD-10-CM

## 2022-10-06 DIAGNOSIS — F32.A ANXIETY AND DEPRESSION: ICD-10-CM

## 2022-10-06 DIAGNOSIS — R20.0 NUMBNESS AND TINGLING OF BOTH FEET: ICD-10-CM

## 2022-10-06 DIAGNOSIS — K58.1 IRRITABLE BOWEL SYNDROME WITH CONSTIPATION: Primary | ICD-10-CM

## 2022-10-06 PROCEDURE — 85027 COMPLETE CBC AUTOMATED: CPT | Performed by: NURSE PRACTITIONER

## 2022-10-06 PROCEDURE — 80048 BASIC METABOLIC PNL TOTAL CA: CPT | Performed by: NURSE PRACTITIONER

## 2022-10-06 PROCEDURE — 90471 IMMUNIZATION ADMIN: CPT | Performed by: NURSE PRACTITIONER

## 2022-10-06 PROCEDURE — 90686 IIV4 VACC NO PRSV 0.5 ML IM: CPT | Performed by: NURSE PRACTITIONER

## 2022-10-06 PROCEDURE — 83036 HEMOGLOBIN GLYCOSYLATED A1C: CPT | Performed by: NURSE PRACTITIONER

## 2022-10-06 PROCEDURE — 99214 OFFICE O/P EST MOD 30 MIN: CPT | Performed by: NURSE PRACTITIONER

## 2022-10-06 NOTE — PROGRESS NOTES
"Chief Complaint  Follow-up (3 month follow up dizziness/concerned about possible flare of autoimmune issues. Has a dry cough and has some concerns with that )    Chance Frazier presents to [facility]  History of Present Illness    Patient presents for follow-up visit.  She previously complained of increasing dizziness, intermittent swelling to bilateral hands and feet that was accompanied with discoloration and neuropathy.  GISELLE was ordered and patient was referred to rheumatology.  Labs were unremarkable.  GISELLE showed normal digital pressures. Patient seen by Rheumatology but labs were inconclusive. Rheumatology referred to Vascular due to neuropathy, who reportedly feels that symptoms are autoimmune in nature. She will go for additional ultrasound on 10/31.  Today, patient c/o generalized swelling and dry cough with accompanying back pain. She denies sore throat, fever, ear pain, chest pain or wheezing. Patient does have dyspnea after coughing spells. She has had Pulmonology workup in the past which was all normal.     Patienet also has IBS with constipation. Patient is on Linzess, taking as prescribed. She does also have a gluten sensitivity. Last colonoscopy 2021, found polyps and erosion in large intestine. Patient will repeat in June. She is followed by [facility].    Patient is taking Trintellix for Depression and Propranolol 40mg daily for anxiety. Patient reports symptoms are well controlled. She does have intermittent dizziness and fatigue, concerned with ongoing side effects from Propranolol. Patient is taking Vyvanse for ADHD, symptoms are stable.     Patient has plaque psoriasis, followed by [facility].  She is taking Enbrel injections.    Patient has prediabetes, stable with diet and exercise.     Objective   Vital Signs:  /80 (BP Location: Left arm, Patient Position: Sitting, Cuff Size: Adult)   Pulse 95   Ht 162.6 cm (64\")   Wt 107 kg (236 " "lb)   SpO2 99%   BMI 40.51 kg/m²   Estimated body mass index is 40.51 kg/m² as calculated from the following:    Height as of this encounter: 162.6 cm (64\").    Weight as of this encounter: 107 kg (236 lb).    Class 3 Severe Obesity (BMI >=40). Obesity-related health conditions include the following: diabetes mellitus. Obesity is unchanged. BMI is is above average; BMI management plan is completed. We discussed portion control and increasing exercise.      Physical Exam  Constitutional:       Appearance: Normal appearance.   HENT:      Head: Normocephalic.   Cardiovascular:      Rate and Rhythm: Normal rate and regular rhythm.   Pulmonary:      Effort: Pulmonary effort is normal.      Breath sounds: Normal breath sounds.   Abdominal:      General: Abdomen is flat. Bowel sounds are normal.      Palpations: Abdomen is soft.   Musculoskeletal:         General: Normal range of motion.      Cervical back: Neck supple.      Right lower leg: No edema.      Left lower leg: No edema.   Skin:     General: Skin is warm and dry.   Neurological:      Mental Status: She is alert and oriented to person, place, and time.      Gait: Gait is intact.   Psychiatric:         Attention and Perception: Attention normal.         Mood and Affect: Mood normal.         Speech: Speech normal.        Result Review :    CMP    CMP 6/15/22 7/6/22   Glucose 112 (A) 106 (A)   BUN 14 13   Creatinine 0.75 0.71   Sodium 139 136   Potassium 4.6 3.9   Chloride 104 102   Calcium 9.2 9.0   Albumin 4.30    Total Bilirubin 0.3    Alkaline Phosphatase 124 (A)    AST (SGOT) 12    ALT (SGPT) 16    (A) Abnormal value            CBC    CBC 6/15/22   WBC 8.58   RBC 4.68   Hemoglobin 12.9   Hematocrit 38.7   MCV 82.7   MCH 27.6   MCHC 33.3   RDW 12.7   Platelets 316               TSH    TSH 6/15/22   TSH 1.810           Data reviewed: Consultant notes Rheumatology          Assessment and Plan   Diagnoses and all orders for this visit:    1. Irritable bowel " syndrome with constipation (Primary)  Assessment & Plan:  1.  Continue Linzess as prescribed  2.  Follow-up with [facility]      2. Attention deficit hyperactivity disorder (ADHD), combined type  Assessment & Plan:  1.  Stable  2.  Continue Vyvanse as prescribed      3. Anxiety and depression  Assessment & Plan:  1.  Stable  2.  Continue Trintellix and propanolol as prescribed  3.  Call with new or worsening concerns      4. Plaque psoriasis  Assessment & Plan:  1.  Continue Enbrel  2.  Follow-up with dermatology      5. Prediabetes  Assessment & Plan:  1.  Repeat A1c    Orders:  -     CBC (No Diff)  -     Basic Metabolic Panel  -     Hemoglobin A1c    6. Dry cough  Assessment & Plan:  1.  Patient has had full pulmonary work-up in the past.  She is to continue daily antihistamine and nasal spray to help prevent postnasal drip.  We discussed a regimen of steroids as that is the only thing that typically provides relief.  However, because patient is in the midst of an autoimmune work-up I advised against anything that would suppress further symptoms.  Patient agrees and we will discuss further at follow-up visit.      7. Numbness and tingling of both feet  Assessment & Plan:  1.  Proceed with vascular screening  2.  Follow-up with vascular surgery and rheumatology as prescribed      Other orders  -     FluLaval/Fluzone >6 mos ([id number])         I spent 30 minutes caring for Binta on this date of service. This time includes time spent by me in the following activities:preparing for the visit, reviewing tests, obtaining and/or reviewing a separately obtained history, performing a medically appropriate examination and/or evaluation , counseling and educating the patient/family/caregiver, ordering medications, tests, or procedures, documenting information in the medical record, independently interpreting results and communicating that information with the patient/family/caregiver and care coordination  Follow Up   Return in  about 3 months (around 1/6/2023) for Depression/Prediabetes.  Patient was given instructions and counseling regarding her condition or for health maintenance advice. Please see specific information pulled into the AVS if appropriate.       Answers for HPI/ROS submitted by the patient on 10/5/2022  What is the primary reason for your visit?: Cough

## 2022-10-06 NOTE — ASSESSMENT & PLAN NOTE
1.  Proceed with vascular screening  2.  Follow-up with vascular surgery and rheumatology as prescribed

## 2022-10-06 NOTE — ASSESSMENT & PLAN NOTE
1.  Patient has had full pulmonary work-up in the past.  She is to continue daily antihistamine and nasal spray to help prevent postnasal drip.  We discussed a regimen of steroids as that is the only thing that typically provides relief.  However, because patient is in the midst of an autoimmune work-up I advised against anything that would suppress further symptoms.  Patient agrees and we will discuss further at follow-up visit.

## 2022-10-06 NOTE — PROGRESS NOTES
Venipuncture Blood Specimen Collection  Venipuncture performed in the right arm by Tamika Muller MA with good hemostasis. Patient tolerated the procedure well without complications.   10/06/22   Tamika Muller MA

## 2022-10-07 LAB
ANION GAP SERPL CALCULATED.3IONS-SCNC: 12 MMOL/L (ref 5–15)
BUN SERPL-MCNC: 16 MG/DL (ref 6–20)
BUN/CREAT SERPL: 21.3 (ref 7–25)
CALCIUM SPEC-SCNC: 9.6 MG/DL (ref 8.6–10.5)
CHLORIDE SERPL-SCNC: 103 MMOL/L (ref 98–107)
CO2 SERPL-SCNC: 24 MMOL/L (ref 22–29)
CREAT SERPL-MCNC: 0.75 MG/DL (ref 0.57–1)
DEPRECATED RDW RBC AUTO: 38.2 FL (ref 37–54)
EGFRCR SERPLBLD CKD-EPI 2021: 108 ML/MIN/1.73
ERYTHROCYTE [DISTWIDTH] IN BLOOD BY AUTOMATED COUNT: 12.9 % (ref 12.3–15.4)
GLUCOSE SERPL-MCNC: 92 MG/DL (ref 65–99)
HBA1C MFR BLD: 5.6 % (ref 3.5–5.6)
HCT VFR BLD AUTO: 40.3 % (ref 34–46.6)
HGB BLD-MCNC: 13.3 G/DL (ref 12–15.9)
MCH RBC QN AUTO: 26.9 PG (ref 26.6–33)
MCHC RBC AUTO-ENTMCNC: 33 G/DL (ref 31.5–35.7)
MCV RBC AUTO: 81.4 FL (ref 79–97)
PLATELET # BLD AUTO: 305 10*3/MM3 (ref 140–450)
PMV BLD AUTO: 9.8 FL (ref 6–12)
POTASSIUM SERPL-SCNC: 4.4 MMOL/L (ref 3.5–5.2)
RBC # BLD AUTO: 4.95 10*6/MM3 (ref 3.77–5.28)
SODIUM SERPL-SCNC: 139 MMOL/L (ref 136–145)
WBC NRBC COR # BLD: 7.14 10*3/MM3 (ref 3.4–10.8)

## 2022-10-24 DIAGNOSIS — F90.2 ATTENTION DEFICIT HYPERACTIVITY DISORDER (ADHD), COMBINED TYPE: ICD-10-CM

## 2022-10-24 NOTE — TELEPHONE ENCOUNTER
Caller: Binta Frazier    Relationship: Self    Best call back number: [phone]    Requested Prescriptions:     Requested Prescriptions     Pending Prescriptions Disp Refills   • lisdexamfetamine (VYVANSE) 40 MG capsule 30 capsule 0     Sig: Take 1 capsule by mouth Every Morning for 30 days   • Vortioxetine HBr (Trintellix) 10 MG tablet tablet 30 tablet         Pharmacy where request should be sent: [facility] [address] [phone] [place] [phone] FX     Does the patient have less than a 3 day supply:  [] Yes  [x] No    Sylvia Owen Rep   10/24/22 15:19 EDT

## 2022-11-14 DIAGNOSIS — R20.2 NUMBNESS AND TINGLING OF BOTH FEET: Primary | ICD-10-CM

## 2022-11-14 DIAGNOSIS — R20.0 NUMBNESS AND TINGLING OF BOTH FEET: Primary | ICD-10-CM

## 2022-11-14 DIAGNOSIS — G43.009 MIGRAINE WITHOUT AURA AND WITHOUT STATUS MIGRAINOSUS, NOT INTRACTABLE: ICD-10-CM

## 2022-11-14 DIAGNOSIS — R42 DIZZINESS: ICD-10-CM

## 2022-11-14 RX ORDER — RIMEGEPANT SULFATE 75 MG/75MG
1 TABLET, ORALLY DISINTEGRATING ORAL DAILY PRN
Qty: 30 TABLET | Refills: 1 | Status: SHIPPED | OUTPATIENT
Start: 2022-11-14 | End: 2022-11-22

## 2022-11-30 DIAGNOSIS — F90.2 ATTENTION DEFICIT HYPERACTIVITY DISORDER (ADHD), COMBINED TYPE: ICD-10-CM

## 2022-12-06 ENCOUNTER — TELEPHONE (OUTPATIENT)
Dept: FAMILY MEDICINE CLINIC | Facility: CLINIC | Age: 32
End: 2022-12-06

## 2022-12-06 NOTE — TELEPHONE ENCOUNTER
"Per Referral communications: \"Dr. Paulino doesn't treat migraines and the only provider that will do 2nd opinions is Dr. Baker\"     and     \"ROUTING BACK TO REFERRING: DECLINED; DR PAULINO DOES NOT SEE FOR MIGRAINE; WE DO NOT SEE 2ND OPINIONS\"    Okay to send to Dr. Baker? Please advise.      "

## 2022-12-09 ENCOUNTER — OFFICE VISIT (OUTPATIENT)
Dept: NEUROLOGY | Facility: CLINIC | Age: 32
End: 2022-12-09

## 2022-12-09 VITALS — OXYGEN SATURATION: 100 % | WEIGHT: 233.4 LBS | BODY MASS INDEX: 39.85 KG/M2 | HEIGHT: 64 IN

## 2022-12-09 DIAGNOSIS — G43.009 MIGRAINE WITHOUT AURA AND WITHOUT STATUS MIGRAINOSUS, NOT INTRACTABLE: Primary | ICD-10-CM

## 2022-12-09 DIAGNOSIS — H81.10 BENIGN PAROXYSMAL POSITIONAL VERTIGO, UNSPECIFIED LATERALITY: ICD-10-CM

## 2022-12-09 PROCEDURE — 99204 OFFICE O/P NEW MOD 45 MIN: CPT | Performed by: PSYCHIATRY & NEUROLOGY

## 2022-12-09 RX ORDER — RIMEGEPANT SULFATE 75 MG/75MG
TABLET, ORALLY DISINTEGRATING ORAL
COMMUNITY
Start: 2022-11-22

## 2022-12-09 NOTE — PROGRESS NOTES
"Notes by MA:  Patient referred to our office for migraines, dizziness and occasional numbness in her arms and legs.       Subjective:     Dear Ora, I had the pleasure of seeing your patient Mrs. Frazier in consultation today.  As you know, she is a pleasant 32-year-old right-handed woman sent for evaluation of dizziness and headaches.  She has had headaches that she describes as migraines since her early 20s.  They typically are posterior in onset and spread to the left side of her head and are commonly associated with throbbing.  They are long-lasting usually lasting for several hours.  They are associated with nausea, light sensitivity and sound sensitivity.  For prophylaxis she has tried and failed gabapentin and Topamax in the past because of side effects.  She is currently having 1-3 headache days per month and has recently been prescribed Nurtec for acute therapy which she has filled but not yet tried.    For the past couple of months she has had episodes of dizziness that are triggered by turning her head, particularly to the left.  She sometimes has \"top shelf vertigo\" as well as lightheadedness and dizziness when she lays back in bed or rolls over in bed and particularly when she bends over and stands back up.  Orthostatics today are negative, please see details below.  No ringing in her ears.  No new hearing loss.  Patient ID: Binta Frazier is a 32 y.o. female.    History of Present Illness  The following portions of the patient's history were reviewed and updated as appropriate: allergies, current medications, past family history, past medical history, past social history, past surgical history and problem list.    Review of Systems   Constitutional: Positive for fatigue. Negative for activity change and appetite change.   HENT: Negative for facial swelling and trouble swallowing.    Eyes: Negative for photophobia, pain and visual disturbance.   Respiratory: Negative for chest tightness, shortness " of breath and wheezing.    Cardiovascular: Negative for chest pain, palpitations and leg swelling.   Gastrointestinal: Negative for abdominal pain, nausea and vomiting.   Endocrine: Negative for cold intolerance and heat intolerance.   Musculoskeletal: Negative for arthralgias, back pain, gait problem, joint swelling, myalgias, neck pain and neck stiffness.   Neurological: Positive for dizziness, syncope, light-headedness and numbness (arms & legs). Negative for tremors, seizures, facial asymmetry, speech difficulty, weakness and headaches.   Hematological: Does not bruise/bleed easily.   Psychiatric/Behavioral: Positive for sleep disturbance. Negative for agitation, behavioral problems, confusion, decreased concentration, dysphoric mood, hallucinations, self-injury and suicidal ideas. The patient is not nervous/anxious and is not hyperactive.         Objective:    Neurologic Exam     Mental Status   Oriented to person, place, and time.   Attention: normal.   Speech: speech is normal   Level of consciousness: alert    Cranial Nerves   Cranial nerves II through XII intact.     Motor Exam   Muscle bulk: normal  Overall muscle tone: normal    Strength   Right neck flexion: 5/5  Left neck flexion: 5/5  Right neck extension: 5/5  Left neck extension: 5/5  Right deltoid: 5/5  Left deltoid: 5/5  Right biceps: 5/5  Left biceps: 5/5  Right triceps: 5/5  Left triceps: 5/5  Right wrist flexion: 5/5  Left wrist flexion: 5/5  Right wrist extension: 5/5  Left wrist extension: 5/5  Right interossei: 5/5  Left interossei: 5/5  Right abdominals: 5/5  Left abdominals: 5/5  Right iliopsoas: 5/5  Left iliopsoas: 5/5  Right quadriceps: 5/5  Left quadriceps: 5/5  Right hamstrin/5  Left hamstrin/5  Right glutei: 5/5  Left glutei: 5/5  Right anterior tibial: 5/5  Left anterior tibial: 5/5  Right posterior tibial: 5/5  Left posterior tibial: 5/5  Right peroneal: 5/5  Left peroneal: 5/5  Right gastroc: 5/5  Left gastroc:  5/5    Sensory Exam   Light touch normal.   Vibration normal.     Gait, Coordination, and Reflexes     Gait  Gait: normal    Coordination   Romberg: negative  Finger to nose coordination: normal  Heel to shin coordination: normal    Tremor   Resting tremor: absent  Intention tremor: absent    Reflexes   Right brachioradialis: 2+  Left brachioradialis: 2+  Right biceps: 2+  Left biceps: 2+  Right triceps: 2+  Left triceps: 2+  Right patellar: 2+  Left patellar: 2+  Right achilles: 2+  Left achilles: 2+  Right : 2+  Left : 2+      Physical Exam  Constitutional:       Appearance: She is obese.   Neck:      Vascular: No carotid bruit.   Cardiovascular:      Rate and Rhythm: Regular rhythm.   Musculoskeletal:      Cervical back: Neck supple.   Neurological:      Mental Status: She is oriented to person, place, and time.      Cranial Nerves: Cranial nerves 2-12 are intact.      Coordination: Finger-Nose-Finger Test, Heel to Shin Test and Romberg Test normal.      Gait: Gait is intact.      Deep Tendon Reflexes:      Reflex Scores:       Tricep reflexes are 2+ on the right side and 2+ on the left side.       Bicep reflexes are 2+ on the right side and 2+ on the left side.       Brachioradialis reflexes are 2+ on the right side and 2+ on the left side.       Patellar reflexes are 2+ on the right side and 2+ on the left side.       Achilles reflexes are 2+ on the right side and 2+ on the left side.  Psychiatric:         Speech: Speech normal.         Assessment/Plan:     Diagnoses and all orders for this visit:    1. Migraine without aura and without status migrainosus, not intractable (Primary)    2. Benign paroxysmal positional vertigo, unspecified laterality  -     Ambulatory Referral to Physical Therapy Vestibular, Evaluate and treat     32-year-old woman with a long history of headaches satisfying diagnostic criteria for migraine.  They are not frequent enough to warrant prophylactic therapy at this time.  She  has already filled her prescription for Nurtec for acute therapy which I think is an excellent choice and I have encouraged her to use it for her next migraine and gave her specific instructions on its appropriate use and potential side effects.  We also had a discussion about lifestyle factors that can affect the frequency and severity of her headaches.    Her intermittent position dependent lightheadedness is suspicious for benign paroxysmal positional vertigo.  She does have history of ear problems on the left.  I think it is worth her a while to undergo vestibular evaluation and possible modified Epley maneuvers and I will make those arrangements for her.    Thank you very much for the opportunity to participate in the care of this pleasant young lady.

## 2022-12-15 ENCOUNTER — PATIENT ROUNDING (BHMG ONLY) (OUTPATIENT)
Dept: NEUROLOGY | Facility: CLINIC | Age: 32
End: 2022-12-15

## 2022-12-15 ENCOUNTER — TREATMENT (OUTPATIENT)
Dept: PHYSICAL THERAPY | Facility: CLINIC | Age: 32
End: 2022-12-15

## 2022-12-15 DIAGNOSIS — H81.10 BENIGN PAROXYSMAL POSITIONAL VERTIGO, UNSPECIFIED LATERALITY: Primary | ICD-10-CM

## 2022-12-15 DIAGNOSIS — R42 VERTIGO: ICD-10-CM

## 2022-12-15 PROCEDURE — 95992 CANALITH REPOSITIONING PROC: CPT | Performed by: PHYSICAL THERAPIST

## 2022-12-15 PROCEDURE — 97161 PT EVAL LOW COMPLEX 20 MIN: CPT | Performed by: PHYSICAL THERAPIST

## 2022-12-15 PROCEDURE — 97112 NEUROMUSCULAR REEDUCATION: CPT | Performed by: PHYSICAL THERAPIST

## 2022-12-15 NOTE — PROGRESS NOTES
Physical Therapy Initial Evaluation and Plan of Care    [facility] [place] [place] [facility] [place]  [address], [address]  [place]  [address]    Patient: Binta Frazier   : 1990  Diagnosis/ICD-10 Code:  Benign paroxysmal positional vertigo, unspecified laterality [H81.10]  Referring practitioner: Jose Baker, *  Date of Initial Visit: 12/15/2022  Today's Date: 12/15/2022  Patient seen for 1 sessions           Subjective Questionnaire: DHI: 48% impairment       Subjective Evaluation    History of Present Illness  Mechanism of injury: Pt has vertigo going on for the past 2.5 months. Is going through some autoimmune issues right now but is not sure what the final diagnosis is yet, but they are leaning toward vasculitis. Has had vitamin D checked, thyroid checked, and those are fine. C-reactive protein is very high, so that is what they are thinking vasculitis.     Vertigo lately - gets a tunnel vision and then vision comes back, gets lightheaded and dizzy, if she bends down and stands up she will blackout (if she squats down it is not as bad), quick head turns causes dizziness, has ringing in her ears     Has had 3 concussions - first was under 8 yr/o, 2nd was playing soccer in high school, 3rd was at work and had post concussive syndrome for 2 weeks. Has had a brain MRI like 10 years ago.     She has 3-4 bouts of dizziness a day usually, and then sometimes lately it is 9-10 times a day. Only drives short distances.     Has been seen by neurology and they think it is vertigo. She also has migraines and takes a rescue medication. Also has had blood sugar checked and it is not a low blood sugar issue. Has optical migraines and sometimes loses vision in the left eye from this.     Has plaque psoriasis and gut issues as well.     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  Quality of life:  good    Pain  No pain reported    Treatments  Previous treatment: chiropractic  Patient Goals  Patient goals for therapy: return to sport/leisure activities  Patient goal: not be dizzy or spin or black out with positional changes            Objective          Ambulation     Comments   VESTIBULAR:  Vertigo / hypofunction    VOMS:      Baseline symptoms-  2/10            Smooth pursuit - normal            Saccades vertical - 3/10           Saccades horizontal - nystagmus to left, 5/10            Convergence (near point) - 3/10            VOR - horizontal normal            VOR - vertical normal            Visual Motion Sensitivity Test :    Antoine SOP:     Condition 1 - N     Condition 2 - N     Condition 3 - N     Condition 4 - S     Condition 5 - S     Condition 6 - S     Condition 7 - F    Vertebral AA test: negative (B)     SL Hallpike test: positive           Assessment & Plan     Assessment  Impairments: abnormal coordination, abnormal gait, activity intolerance, impaired balance, impaired physical strength, lacks appropriate home exercise program and safety issue  Functional Limitations: sleeping, moving in bed, stooping and unable to perform repetitive tasks  Assessment details: Pt is a 32 yr/o female presenting with dizziness and vestibular impairment.   Per DHI she reports 48% impairment. She is positive for vertiginous causes of dizziness including vertigo and likely post concussive syndrome. She has positive signs for impaired VOMS and GansSOP, as well as general symptomatic issues with head turns and eye movements. She also has left eye nystagmus with horizontal saccades. She would benefit from vestibular hypofunction re-training and post concussive training as well.   Educated on results of evaluation, as well as initial HEP. Pt with good understanding. Recommend skilled OPPT to address above issues, pt in agreement.   Prognosis: good    Goals  Plan Goals: STG: to be met within 6 visits   1. Pt to be (I)  with initial HEP  2. Pt to report 25% decrease in spinning and dizziness on a daily basis for improved QOL  3. Pt to report minimal dizziness with head turns horizontally     LTG: to be met by DC   1. Pt to be (I) with finalized HEP  2. Pt to report decreased impairment per DHI to less than 10%   3. Pt to show normal VOMS and GansSOP testing for normal vestibular function  4. Pt to report no increase in dizziness or spinning with positional changes for improved safety and QOL.     Plan  Therapy options: will be seen for skilled therapy services  Planned modality interventions: cryotherapy, thermotherapy (hydrocollator packs) and dry needling  Other planned modality interventions: Reiki Therapy  Planned therapy interventions: manual therapy, neuromuscular re-education, spinal/joint mobilization, strengthening, therapeutic activities, home exercise program, gait training, functional ROM exercises, fine motor coordination training and balance/weight-bearing training  Other planned therapy interventions: canalith repositioning techniques and maneuvers  Frequency: 2x week  Duration in weeks: 13  Treatment plan discussed with: patient      History # of Personal Factors and/or Comorbidities: LOW (0)  Examination of Body System(s): # of elements: LOW (1-2)  Clinical Presentation: STABLE   Clinical Decision Making: LOW     Timed:         Manual Therapy:         mins  76029;     Therapeutic Exercise:         mins  80129;     Neuromuscular Ace:    16    mins  15703;    Therapeutic Activity:          mins  65419;     Gait Training:           mins  92154;     Ultrasound:          mins  40741;    Ionto                                   mins   25861  Self Care                            mins   71891      Un-Timed:  Electrical Stimulation:         mins  15520 (MC );  Canalith Repos   20      mins 35782  Traction          mins 75376  Dry Needle                 ______ mins DRYNDL  Low Eval      20    Mins  38036  Mod Eval           Mins  95037  High Eval                            Mins  61750  Re-Eval                               mins  56885        Timed Treatment:   16   mins   Total Treatment:     56   mins    PT SIGNATURE: Sherron Alfaro, PT   DATE TREATMENT INITIATED: 12/15/2022    Initial Certification  Certification Period: 12/15/2022 through 3/15/2023  I certify that the therapy services are furnished while this patient is under my care.  The services outlined above are required by this patient, and will be reviewed every 90 days.     PHYSICIAN: Jose Baker MD      DATE:     Please sign and return via fax to [phone]. Thank you, [facility].

## 2022-12-22 ENCOUNTER — TREATMENT (OUTPATIENT)
Dept: PHYSICAL THERAPY | Facility: CLINIC | Age: 32
End: 2022-12-22

## 2022-12-22 DIAGNOSIS — H81.10 BENIGN PAROXYSMAL POSITIONAL VERTIGO, UNSPECIFIED LATERALITY: Primary | ICD-10-CM

## 2022-12-22 DIAGNOSIS — R42 VERTIGO: ICD-10-CM

## 2022-12-22 PROCEDURE — 95992 CANALITH REPOSITIONING PROC: CPT | Performed by: PHYSICAL THERAPIST

## 2022-12-22 PROCEDURE — 97112 NEUROMUSCULAR REEDUCATION: CPT | Performed by: PHYSICAL THERAPIST

## 2022-12-22 NOTE — PROGRESS NOTES
"Physical Therapy Treatment Note  [facility] [place] [facility] [place]  [address], [address]  [place]  [address]    VISIT#: 2    Subjective   Bintajessica Frazier reports: Was doing ok after last session but yesterday she had a lymphatic drainage massage and when she flipped over back to belly, she had an intense dizziness and spinning sensation.   She did have progress with less dizziness and spinning for a couple days after our first session.       Objective     See Exercise, Manual, and Modality Logs for complete treatment.     Patient Education: continued CRT indicated, discussing POTS again - has talked to her PCP about this already     Assessment/Plan  Pt with improved symptoms and movement during CRT today. Slight dizziness present at end of session, however PT educated pt that the next 24 hours may feel a little \"off\" since we are attempting to change her new normal. Pt with good understanding.    Progress per Plan of Care        Timed:         Manual Therapy:         mins  38144;     Therapeutic Exercise:         mins  49725;     Neuromuscular Ace:    14    mins  42570;    Therapeutic Activity:          mins  24026;     Gait Training:           mins  52721;     Ultrasound:          mins  83491;    Ionto                                   mins   49908  Self Care                            mins   34594      Un-Timed:  Electrical Stimulation:         mins  27641 ( );  Canalith Repos               16    mins  28253  Traction          mins 74071  Dry Needle                 ______ mins DRYNDL  Low Eval          Mins  22733  Mod Eval          Mins  57225  High Eval                            Mins  44725  Re-Eval                               mins  01493    Timed Treatment:   14   mins   Total Treatment:     30   mins    Sherron Alfaro, PT    Physical Therapist  "

## 2022-12-27 DIAGNOSIS — F90.2 ATTENTION DEFICIT HYPERACTIVITY DISORDER (ADHD), COMBINED TYPE: ICD-10-CM

## 2022-12-27 NOTE — TELEPHONE ENCOUNTER
Caller: Binta Frazier    Relationship: Self    Best call back number: [phone]    Requested Prescriptions:   Requested Prescriptions     Pending Prescriptions Disp Refills   • Vortioxetine HBr (Trintellix) 10 MG tablet tablet 30 tablet 1     Sig: Take 1 tablet by mouth Daily With Breakfast.   • lisdexamfetamine (VYVANSE) 40 MG capsule 30 capsule 0     Sig: Take 1 capsule by mouth Every Morning for 30 days        Pharmacy where request should be sent: [address] [phone] [place] [phone]      Additional details provided by patient: PATIENT STATES SHE HAS 2-3 DAYS LEFT ON Vortioxetine HBr (Trintellix) 10 MG tablet tablet PRESCRIPTION.    PATIENT STATES THAT SHE HAS ENOUGH FOR THE REST OF THE WEEK FOR HER lisdexamfetamine (VYVANSE) 40 MG capsule PRESCRIPTION.     Does the patient have less than a 3 day supply:  [x] Yes  [] No    Would you like a call back once the refill request has been completed: [] Yes [] No    If the office needs to give you a call back, can they leave a voicemail: [] Yes [] No    Sylvia Alexandre Rep   12/27/22 12:38 EST

## 2022-12-29 ENCOUNTER — TREATMENT (OUTPATIENT)
Dept: PHYSICAL THERAPY | Facility: CLINIC | Age: 32
End: 2022-12-29

## 2022-12-29 DIAGNOSIS — H81.10 BENIGN PAROXYSMAL POSITIONAL VERTIGO, UNSPECIFIED LATERALITY: Primary | ICD-10-CM

## 2022-12-29 DIAGNOSIS — R42 VERTIGO: ICD-10-CM

## 2022-12-29 PROCEDURE — 95992 CANALITH REPOSITIONING PROC: CPT | Performed by: PHYSICAL THERAPIST

## 2022-12-29 PROCEDURE — 97112 NEUROMUSCULAR REEDUCATION: CPT | Performed by: PHYSICAL THERAPIST

## 2022-12-29 NOTE — PROGRESS NOTES
Physical Therapy Treatment Note and MD note   [facility] [place] [facility] [place]  [address], [address]  [place]  [address]    VISIT#: 3    Subjective   Binta Chenancy reports: [place] kind of off for the next couple days after last session, but Kim Bonner she had a big spinning episode as she was sitting up from being on the ground with her feet on the couch. The spinning lasted a minute or two.     Objective     See Exercise, Manual, and Modality Logs for complete treatment.     Patient Education: following up with MD regarding POTS, audiology through [facility]IB program    Assessment/Plan   Pt with improved dizziness symptoms after session today, she will see her MD next week to follow up on vestibular maneuvers and possible other testing. PT to follow up after next week regarding further vertiginous causes of dizziness vs other medical issues as root cause. Pt in agreement.     [facility] possibility if vertigo tx for inner ear PT does not help enough. Pt to see MD next week to determine next step.     Progress per Plan of Care        Timed:         Manual Therapy:         mins  90954;     Therapeutic Exercise:         mins  32002;     Neuromuscular Ace:    15    mins  49021;    Therapeutic Activity:          mins  55457;     Gait Training:           mins  94466;     Ultrasound:          mins  92855;    Ionto                                   mins   75633  Self Care                            mins   80710      Un-Timed:  Electrical Stimulation:         mins  98304 ( );  Canalith Repos               17    mins  47634  Traction          mins 35470  Dry Needle                 ______ mins DRYNDL  Low Eval          Mins  87437  Mod Eval          Mins  94833  High Eval                            Mins  49730  Re-Eval                               mins  29133    Timed Treatment:   15   mins   Total Treatment:     35   mins    Sherron Alfaro PT    Physical Therapist

## 2023-01-06 ENCOUNTER — OFFICE VISIT (OUTPATIENT)
Dept: FAMILY MEDICINE CLINIC | Facility: CLINIC | Age: 33
End: 2023-01-06
Payer: COMMERCIAL

## 2023-01-06 VITALS
WEIGHT: 235 LBS | SYSTOLIC BLOOD PRESSURE: 132 MMHG | OXYGEN SATURATION: 99 % | DIASTOLIC BLOOD PRESSURE: 78 MMHG | HEART RATE: 103 BPM | HEIGHT: 64 IN | BODY MASS INDEX: 40.12 KG/M2

## 2023-01-06 DIAGNOSIS — R00.2 PALPITATIONS: ICD-10-CM

## 2023-01-06 DIAGNOSIS — R42 DIZZINESS: Primary | ICD-10-CM

## 2023-01-06 DIAGNOSIS — K58.1 IRRITABLE BOWEL SYNDROME WITH CONSTIPATION: ICD-10-CM

## 2023-01-06 DIAGNOSIS — F90.2 ATTENTION DEFICIT HYPERACTIVITY DISORDER (ADHD), COMBINED TYPE: ICD-10-CM

## 2023-01-06 DIAGNOSIS — R05.8 DRY COUGH: ICD-10-CM

## 2023-01-06 DIAGNOSIS — F32.A ANXIETY AND DEPRESSION: ICD-10-CM

## 2023-01-06 DIAGNOSIS — R55 NEAR SYNCOPE: ICD-10-CM

## 2023-01-06 DIAGNOSIS — F41.9 ANXIETY AND DEPRESSION: ICD-10-CM

## 2023-01-06 DIAGNOSIS — L40.0 PLAQUE PSORIASIS: ICD-10-CM

## 2023-01-06 PROCEDURE — 99214 OFFICE O/P EST MOD 30 MIN: CPT | Performed by: NURSE PRACTITIONER

## 2023-01-06 NOTE — PROGRESS NOTES
Chief Complaint  Follow-up (3 month follow up depression)    Chance Frazier presents to [facility]  History of Present Illness    Patient presents for follow-up visit.    Patient seen previously with complaints of persistent dizziness, intermittent swelling to bilateral hands and feet that is accompanied with discoloration and neuropathy.GISELLE was ordered and patient was referred to rheumatology.  Labs were unremarkable.  GISELLE showed normal digital pressures. Patient seen by Rheumatology but labs were inconclusive, although there are markers for an autoimmune vascular disease. Rheumatology referred to Vascular due to neuropathy, who reportedly feels that symptoms are autoimmune in nature.  She was seen again by Dr. Gupta, did not feel that symptoms/labs significant enough for treatment. Patient was evaluated by neurology, advised that symptoms are suspicious for benign paroxysmal positional vertigo.  She was referred for vestibular evaluation. Patient has done three sessions without improvement. PT discussed possible workup for POTS. On 1/4, patient had an episode of heart racing, dizziness and near syncope.     Patient has chronic cough with previous pulmonary work-up that was unremarkable. Rheumatology referred back to Pulmonology. PFT completed and was normal. Labs also ordered. She will see Dr. Boggs for f/u on 1/31.     Patienet also has IBS with constipation. Patient is on Linzess, taking as prescribed. She does also have a gluten sensitivity. Last colonoscopy 2021, found polyps and erosion in large intestine. Patient will repeat in June. She is followed by [facility].    Patient is taking Trintellix 10mg daily for depression and anxiety.  She is stable on Vyvanse for ADHD    Patient has plaque psoriasis, followed by [facility].  She is taking Enbrel injections. Rheumatology contacted Dermatology and advised discontinuation of Enbrel due to possible  exacerbation of vascular symptoms. Patient will see Dermatology on 1/17.     Objective   Vital Signs:  /78 (BP Location: Left arm, Patient Position: Sitting, Cuff Size: Large Adult)   Pulse 103   Ht 162.6 cm (64\")   Wt 107 kg (235 lb)   SpO2 99%   BMI 40.34 kg/m²   Estimated body mass index is 40.34 kg/m² as calculated from the following:    Height as of this encounter: 162.6 cm (64\").    Weight as of this encounter: 107 kg (235 lb).          Physical Exam  Constitutional:       Appearance: Normal appearance.   HENT:      Head: Normocephalic.   Cardiovascular:      Rate and Rhythm: Normal rate and regular rhythm.   Pulmonary:      Effort: Pulmonary effort is normal.      Breath sounds: Normal breath sounds.   Abdominal:      General: Abdomen is flat. Bowel sounds are normal.      Palpations: Abdomen is soft.   Musculoskeletal:         General: Normal range of motion.      Cervical back: Neck supple.      Right lower leg: No edema.      Left lower leg: No edema.   Skin:     General: Skin is warm and dry.   Neurological:      Mental Status: She is alert and oriented to person, place, and time.      Gait: Gait is intact.   Psychiatric:         Attention and Perception: Attention normal.         Mood and Affect: Mood normal.         Speech: Speech normal.        Result Review :    CMP    CMP 6/15/22 7/6/22 10/6/22   Glucose 112 (A) 106 (A) 92   BUN 14 13 16   Creatinine 0.75 0.71 0.75   eGFR 108.0 115.3 108.0   Sodium 139 136 139   Potassium 4.6 3.9 4.4   Chloride 104 102 103   Calcium 9.2 9.0 9.6   Total Protein 6.7     Albumin 4.30     Globulin 2.4     Total Bilirubin 0.3     Alkaline Phosphatase 124 (A)     AST (SGOT) 12     ALT (SGPT) 16     Albumin/Globulin Ratio 1.8     BUN/Creatinine Ratio 18.7 18.3 21.3   Anion Gap 10.0 10.0 12.0   (A) Abnormal value       Comments are available for some flowsheets but are not being displayed.           CBC    CBC 6/15/22 10/6/22   WBC 8.58 7.14   RBC 4.68 4.95    Hemoglobin 12.9 13.3   Hematocrit 38.7 40.3   MCV 82.7 81.4   MCH 27.6 26.9   MCHC 33.3 33.0   RDW 12.7 12.9   Platelets 316 305               TSH    TSH 6/15/22   TSH 1.810           Most Recent A1C    HGBA1C Most Recent 10/6/22   Hemoglobin A1C 5.6                       Data reviewed: Consultant notes Pulmonology/Rheumatology          Assessment and Plan   Diagnoses and all orders for this visit:    1. Dizziness (Primary)  Assessment & Plan:  1.  Refer to cardiology    Orders:  -     Ambulatory Referral to Cardiology    2. Palpitations  Assessment & Plan:  1.  Refer to cardiology for further evaluation, discussed possible tilt table test and rule out POTS    Orders:  -     Ambulatory Referral to Cardiology    3. Near syncope  -     Ambulatory Referral to Cardiology    4. Anxiety and depression  Assessment & Plan:  1.  Continue Trintellix 10 mg daily      5. Attention deficit hyperactivity disorder (ADHD), combined type  Assessment & Plan:  1.  Stable, continue Vyvanse as prescribed      6. Irritable bowel syndrome with constipation  Assessment & Plan:  1.  Continue Linzess as prescribed, follow-up with GI as instructed      7. Plaque psoriasis  Assessment & Plan:  1.  Keep follow-up with dermatology to discuss alternate treatment options      8. Dry cough  Assessment & Plan:  1.  Chronic, follow-up with pulmonology      Other orders  -     Vortioxetine HBr (Trintellix) 10 MG tablet tablet; Take 1 tablet by mouth Daily With Breakfast.  Dispense: 90 tablet; Refill: 1         I spent 30 minutes caring for Binta on this date of service. This time includes time spent by me in the following activities:preparing for the visit, reviewing tests, obtaining and/or reviewing a separately obtained history, performing a medically appropriate examination and/or evaluation , counseling and educating the patient/family/caregiver, ordering medications, tests, or procedures, referring and communicating with other health care  professionals , documenting information in the medical record, independently interpreting results and communicating that information with the patient/family/caregiver and care coordination  Follow Up   Return in about 3 months (around 4/6/2023) for Depression/ADHD.  Patient was given instructions and counseling regarding her condition or for health maintenance advice. Please see specific information pulled into the AVS if appropriate.       Answers for HPI/ROS submitted by the patient on 1/5/2023  Please describe your symptoms.: Cough, leg sweeling and pain continue, fast heart rate, dizziness, lightheaded, room spinning  Have you had these symptoms before?: No  How long have you been having these symptoms?: Greater than 2 weeks  What is the primary reason for your visit?: Other

## 2023-01-06 NOTE — ASSESSMENT & PLAN NOTE
1.  Refer to cardiology for further evaluation, discussed possible tilt table test and rule out POTS

## 2023-01-18 ENCOUNTER — OFFICE (AMBULATORY)
Dept: URBAN - METROPOLITAN AREA CLINIC 64 | Facility: CLINIC | Age: 33
End: 2023-01-18

## 2023-01-18 VITALS
HEIGHT: 64 IN | WEIGHT: 238 LBS | DIASTOLIC BLOOD PRESSURE: 93 MMHG | SYSTOLIC BLOOD PRESSURE: 136 MMHG | HEART RATE: 88 BPM

## 2023-01-18 DIAGNOSIS — K59.00 CONSTIPATION, UNSPECIFIED: ICD-10-CM

## 2023-01-18 DIAGNOSIS — R10.11 RIGHT UPPER QUADRANT PAIN: ICD-10-CM

## 2023-01-18 DIAGNOSIS — R74.8 ABNORMAL LEVELS OF OTHER SERUM ENZYMES: ICD-10-CM

## 2023-01-18 DIAGNOSIS — R14.0 ABDOMINAL DISTENSION (GASEOUS): ICD-10-CM

## 2023-01-18 PROCEDURE — 99213 OFFICE O/P EST LOW 20 MIN: CPT | Performed by: NURSE PRACTITIONER

## 2023-01-30 DIAGNOSIS — F90.2 ATTENTION DEFICIT HYPERACTIVITY DISORDER (ADHD), COMBINED TYPE: ICD-10-CM

## 2023-01-31 ENCOUNTER — OFFICE VISIT (OUTPATIENT)
Dept: CARDIOLOGY | Facility: CLINIC | Age: 33
End: 2023-01-31
Payer: COMMERCIAL

## 2023-01-31 VITALS
HEART RATE: 88 BPM | HEIGHT: 64 IN | RESPIRATION RATE: 18 BRPM | SYSTOLIC BLOOD PRESSURE: 122 MMHG | WEIGHT: 237 LBS | DIASTOLIC BLOOD PRESSURE: 88 MMHG | BODY MASS INDEX: 40.46 KG/M2

## 2023-01-31 DIAGNOSIS — R20.0 NUMBNESS AND TINGLING OF BOTH FEET: Primary | ICD-10-CM

## 2023-01-31 DIAGNOSIS — R00.2 PALPITATIONS: ICD-10-CM

## 2023-01-31 DIAGNOSIS — R20.2 NUMBNESS AND TINGLING OF BOTH FEET: Primary | ICD-10-CM

## 2023-01-31 DIAGNOSIS — I95.1 AUTONOMIC ORTHOSTATIC HYPOTENSION: ICD-10-CM

## 2023-01-31 DIAGNOSIS — M79.89 LEG SWELLING: ICD-10-CM

## 2023-01-31 DIAGNOSIS — R55 NEAR SYNCOPE: ICD-10-CM

## 2023-01-31 PROCEDURE — 99204 OFFICE O/P NEW MOD 45 MIN: CPT | Performed by: INTERNAL MEDICINE

## 2023-01-31 PROCEDURE — 93000 ELECTROCARDIOGRAM COMPLETE: CPT | Performed by: INTERNAL MEDICINE

## 2023-01-31 RX ORDER — GUSELKUMAB 100 MG/ML
INJECTION SUBCUTANEOUS
COMMUNITY
Start: 2023-01-22

## 2023-01-31 RX ORDER — MIDODRINE HYDROCHLORIDE 5 MG/1
5 TABLET ORAL 3 TIMES DAILY PRN
Qty: 90 TABLET | Refills: 2 | Status: SHIPPED | OUTPATIENT
Start: 2023-01-31

## 2023-01-31 NOTE — PROGRESS NOTES
"Cardiology Clinic Note  Shakir Villanueva MD, PhD    Subjective:     Encounter Date:01/31/2023      Patient ID: Binta Frazier is a 32 y.o. female.    Chief Complaint:  No chief complaint on file.      HPI:  I the pleasure to see this very pleasant 32-year-old female for dizziness and palpitations especially with positional change.  EKG is normal with normal conduction.  No history of congenital heart history or early CAD or heart failure.  She has some mild lower extremity edema, she has severe plaque psoriasis on immunomodulatory therapy, she has had some syncopal episodes in the past mostly with positional change having difficulty with standing and walking at times but symptoms do wax and wane.  Her heart rate is very regular and she notices higher heart rates when she stands up as well as exerts herself out of proportion.  Her resting heart rate is in the 70s to 80s which is normal with again with normal EKG.  She has no murmurs on exam, no history of any vascular disease.  Her symptoms are more consistent with orthostatic hypotension versus POTS.  She is not on midodrine or Florinef and she is trying to stay hydrated but again symptoms wax and wane and varying severities in duration.    Review of systems otherwise negative x14 point review of systems except was mentioned above      Historical data copied forward from previous encounters in EMR including the history, exam, and assessment/plan has been reviewed and is unchanged unless noted otherwise.    Cardiac medicines reviewed with risk, benefits, and necessity of each discussed.    Risk and benefit of cardiac testing reviewed including death heart attack stroke pain bleeding infection need for vascular /cardiovascular surgery were discussed and the patient     Objective:         /88 Comment: at home  Pulse 88   Resp 18   Ht 162.6 cm (64\")   Wt 108 kg (237 lb)   BMI 40.68 kg/m²     Physical Exam  Regular rate and rhythm no rubs murmurs or gallops " next no heave no lift  No clubbing cyanosis or edema  OB soft nontender nondistended  Clear to auscultation  Skin is warm and dry  Neurologically intact grossly  Normocephalic/atraumatic pupils equal round  No carotid bruits or JVD  Assessment:           Symptoms with palpitations, dizziness lightheadedness and syncope  Consistent with orthostatic hypotension versus POTS  Tilt table test will be ordered  2D echo for completeness and structural functional evaluation  30-day event monitor and rhythm analysis  Midodrine 5,  3 times daily as needed  Avoid dehydration  Prior work-up with labs was unremarkable as well as neurology, pulmonology, vascular with history of Raynaud's type phenomenon in her hands and feet  Diet and weight loss per AHA guidelines  Recommend 3-5 times weekly exercise, avoid supine positioning which would make orthostasis worse  See her back in 60 days for follow-up    Shakir Villanueva MD, PhD    The pleasure to be involved in this patient's cardiovascular care.  Please call with any questions or concerns  Shakir Villanueva MD, PhD    Most recent EKG as reviewed and interpreted by me:    ECG 12 Lead    Date/Time: 1/31/2023 11:37 AM  Performed by: Shakir Villanueva MD  Authorized by: Shakir Villanueva MD   Comparison: not compared with previous ECG   Previous ECG: no previous ECG available  Rhythm: sinus rhythm  Rate: normal  Conduction: conduction normal  QRS axis: normal    Clinical impression: normal ECG             Most recent echo as reviewed and interpreted by me:      Most recent stress test as reviewed and interpreted by me:      Most recent cardiac catheterization as reviewed interpreted by me:  No results found for this or any previous visit.    The following portions of the patient's history were reviewed and updated as appropriate: allergies, current medications, past family history, past medical history, past social history, past surgical history and problem  list.      ROS:  14 point review of systems negative except as mentioned above    Current Outpatient Medications:   •  cetirizine (zyrTEC) 10 MG tablet, Take 10 mg by mouth Daily., Disp: , Rfl:   •  Cyanocobalamin (Nascobal) 500 MCG/0.1ML solution, Every 7 (Seven) Days., Disp: , Rfl:   •  Dapsone 7.5 % gel, ACZONE 7.5 % GEL, Disp: , Rfl:   •  doxycycline (ORACEA) 40 MG capsule, Every Morning., Disp: , Rfl:   •  Guselkumab (Tremfya) 100 MG/ML solution pen-injector, Every 30 (Thirty) Days., Disp: , Rfl:   •  linaclotide (LINZESS) 145 MCG capsule capsule, Every Morning Before Breakfast., Disp: , Rfl:   •  lisdexamfetamine (VYVANSE) 40 MG capsule, Take 1 capsule by mouth Every Morning for 30 days, Disp: 30 capsule, Rfl: 0  •  multivitamin (THERAGRAN) tablet tablet, Take 1 tablet by mouth Daily., Disp: , Rfl:   •  Nurtec 75 MG dispersible tablet, TAKE ONE (1) TABLET BY MOUTH EVERY DAY AS NEEDED FOR MIGRAINE FOR UP TO EIGHT (8) DAYS, Disp: , Rfl:   •  pantoprazole (PROTONIX) 40 MG EC tablet, TAKE ONE (1) TABLET BY MOUTH EVERY MORNING, Disp: , Rfl:   •  Vortioxetine HBr (Trintellix) 10 MG tablet tablet, Take 1 tablet by mouth Daily With Breakfast., Disp: 90 tablet, Rfl: 1    Problem List:  Patient Active Problem List   Diagnosis   • Preventative health care   • Attention deficit hyperactivity disorder (ADHD), combined type   • Anxiety and depression   • Plaque psoriasis   • Prediabetes   • Acne   • Irritable bowel syndrome with constipation   • Leg swelling   • Numbness and tingling of both feet   • Polyarteritis nodosa (HCC)   • Dizziness   • Weight gain   • Dry cough   • Palpitations   • Near syncope     Past Medical History:  Past Medical History:   Diagnosis Date   • ADHD (attention deficit hyperactivity disorder) 2010    Diagnosed in college   • Allergic 1990    mainly seasonal   • Anxiety 2020   • Asthma 1995    used breathing treatments in elementary school   • Colon polyp 2021    small, non-cancerous   • Depression  2007   • Diarrhea 08/22/2019   • Eating disorder 2007    therapy to address in high school and college   • GERD (gastroesophageal reflux disease) 2000    acid reflux since elementary school   • Headache    • Inflammatory bowel disease 2021    last scope in 2021 found erosion in large intestine   • Irritable bowel syndrome 2007   • Nausea 08/22/2019   • Peptic ulceration 2018    stomach ulcers, has since healed   • Plaque psoriasis    • RUQ pain 08/22/2019     Past Surgical History:  Past Surgical History:   Procedure Laterality Date   • COLONOSCOPY  2007, 2021    have had several, cannot remember years   • EYE SURGERY  2017    LASIK   • WISDOM TOOTH EXTRACTION       Social History:  Social History     Socioeconomic History   • Marital status: Single   Tobacco Use   • Smoking status: Never   • Smokeless tobacco: Never   Vaping Use   • Vaping Use: Never used   Substance and Sexual Activity   • Alcohol use: Yes     Comment: social drinker, maybe 1-2 drinks per month   • Drug use: Never   • Sexual activity: Not Currently     Partners: Male     Birth control/protection: None     Comment: about 2 months ago stopped taking pill     Allergies:  Allergies   Allergen Reactions   • Ciprofibrate Anaphylaxis   • Ciprofloxacin Hives, Shortness Of Breath and Swelling   • Sulfa Antibiotics Anaphylaxis   • Benzocaine Unknown - High Severity     Referring to long recovery from inhaled anaesthetic, not topical   • Topiramate Itching     Itching all over, including inside mouth.  No breathing issues, no hives.     Immunizations:  Immunization History   Administered Date(s) Administered   • COVID-19 (PFIZER) PURPLE CAP 12/31/2020, 01/21/2021, 11/05/2021   • Flu Vaccine Quad PF 6-35MO 10/19/2019   • FluLaval/Fluzone >6mos 10/19/2019, 10/06/2022   • Flublok 18+yrs 10/28/2020   • Hep B, Adolescent or Pediatric 06/30/2005   • Hepatitis A 05/05/2018   • Meningococcal MCV4P (Menactra) 01/30/2007   • Pneumococcal Conjugate 13-Valent (PCV13)  11/18/2017   • flucelvax quad pfs =>4 YRS 11/18/2017            In-Office Procedure(s):  No orders to display        ASCVD RIsk Score::  The ASCVD Risk score (Cris MONTOYA, et al., 2019) failed to calculate for the following reasons:    The 2019 ASCVD risk score is only valid for ages 40 to 79    Imaging:         Results for orders placed during the hospital encounter of 08/22/19    US Gallbladder    Narrative  US GALLBLADDER-    Date of Exam: 8/22/2019 8:53 AM    Indication: RUQ ABD PAIN; R10.11-Right upper quadrant pain.    Comparison: None available.    Technique: Transverse and sagittal ultrasound images of the right upper  quadrant were obtained. Doppler evaluation was also conducted.    FINDINGS: The visualized portions the pancreas appear unremarkable. The  liver demonstrates normal homogeneous echotexture without focal  abnormality. The liver size is normal, 15.3 cm in length. No ascites is  seen. Intrahepatic IVC has a normal grayscale appearance. The imaged  portal veins and straight color flow. Portal vein is patent with  hepatopedal flow. No ascites is evident.    The gallbladder is free of shadowing stone, sludge, wall thickening or  pericholecystic fluid. The extrahepatic bile duct caliber is normal, 3  mm. No intrahepatic biliary ductal dilation is seen.    The right kidney measures 12 cm in length without focal cortical lesion,  shadowing stone or hydronephrosis.    Impression  Normal right upper quadrant ultrasound examination.    Electronically Signed By-Dr. Lorraine Contreras MD On:8/22/2019 11:35 AM  This report was finalized on [id number] by Dr. Lorraine Contreras MD.          Lab Review:   Office Visit on 10/06/2022   Component Date Value   • WBC 10/06/2022 7.14    • RBC 10/06/2022 4.95    • Hemoglobin 10/06/2022 13.3    • Hematocrit 10/06/2022 40.3    • MCV 10/06/2022 81.4    • MCH 10/06/2022 26.9    • MCHC 10/06/2022 33.0    • RDW 10/06/2022 12.9    • RDW-SD 10/06/2022 38.2    • MPV 10/06/2022 9.8     • Platelets 10/06/2022 305    • Glucose 10/06/2022 92    • BUN 10/06/2022 16    • Creatinine 10/06/2022 0.75    • Sodium 10/06/2022 139    • Potassium 10/06/2022 4.4    • Chloride 10/06/2022 103    • CO2 10/06/2022 24.0    • Calcium 10/06/2022 9.6    • BUN/Creatinine Ratio 10/06/2022 21.3    • Anion Gap 10/06/2022 12.0    • eGFR 10/06/2022 108.0    • Hemoglobin A1C 10/06/2022 5.6      Recent labs reviewed and interpreted for clinical significance and application            Level of Care:           Shakir Villanueva MD  01/31/23  .

## 2023-02-08 ENCOUNTER — HOSPITAL ENCOUNTER (OUTPATIENT)
Dept: CARDIOLOGY | Facility: HOSPITAL | Age: 33
Discharge: HOME | End: 2023-02-08
Admitting: INTERNAL MEDICINE
Payer: COMMERCIAL

## 2023-02-08 VITALS
DIASTOLIC BLOOD PRESSURE: 89 MMHG | BODY MASS INDEX: 40.42 KG/M2 | OXYGEN SATURATION: 99 % | HEIGHT: 64 IN | HEART RATE: 85 BPM | WEIGHT: 236.77 LBS | TEMPERATURE: 98.1 F | RESPIRATION RATE: 17 BRPM | SYSTOLIC BLOOD PRESSURE: 142 MMHG

## 2023-02-08 DIAGNOSIS — R55 NEAR SYNCOPE: ICD-10-CM

## 2023-02-08 DIAGNOSIS — I95.1 AUTONOMIC ORTHOSTATIC HYPOTENSION: ICD-10-CM

## 2023-02-08 DIAGNOSIS — R00.2 PALPITATIONS: ICD-10-CM

## 2023-02-08 PROCEDURE — 93660 TILT TABLE EVALUATION: CPT | Performed by: INTERNAL MEDICINE

## 2023-02-08 PROCEDURE — 93660 TILT TABLE EVALUATION: CPT

## 2023-02-08 RX ORDER — NITROGLYCERIN 400 UG/1
1 SPRAY ORAL ONCE
Status: COMPLETED | OUTPATIENT
Start: 2023-02-08 | End: 2023-02-08

## 2023-02-08 RX ADMIN — NITROGLYCERIN 1 SPRAY: 400 SPRAY ORAL at 09:42

## 2023-02-17 LAB
BH CV TILT AGENT USED: NORMAL
MAXIMAL PREDICTED HEART RATE: 186 BPM
STRESS TARGET HR: 158 BPM

## 2023-02-23 ENCOUNTER — HOSPITAL ENCOUNTER (OUTPATIENT)
Dept: CARDIOLOGY | Facility: HOSPITAL | Age: 33
Discharge: HOME | End: 2023-02-23
Admitting: INTERNAL MEDICINE
Payer: COMMERCIAL

## 2023-02-23 DIAGNOSIS — I95.1 AUTONOMIC ORTHOSTATIC HYPOTENSION: ICD-10-CM

## 2023-02-23 DIAGNOSIS — R55 NEAR SYNCOPE: ICD-10-CM

## 2023-02-23 DIAGNOSIS — R00.2 PALPITATIONS: ICD-10-CM

## 2023-02-23 PROCEDURE — 93306 TTE W/DOPPLER COMPLETE: CPT | Performed by: INTERNAL MEDICINE

## 2023-02-23 PROCEDURE — 93306 TTE W/DOPPLER COMPLETE: CPT

## 2023-02-24 LAB
BH CV ECHO MEAS - ACS: 1.9 CM
BH CV ECHO MEAS - AO MAX PG: 7.2 MMHG
BH CV ECHO MEAS - AO MEAN PG: 4 MMHG
BH CV ECHO MEAS - AO V2 MAX: 134 CM/SEC
BH CV ECHO MEAS - AO V2 VTI: 28 CM
BH CV ECHO MEAS - AVA(I,D): 2.17 CM2
BH CV ECHO MEAS - EDV(CUBED): 110.6 ML
BH CV ECHO MEAS - EDV(MOD-SP4): 126 ML
BH CV ECHO MEAS - EF(MOD-SP4): 46.7 %
BH CV ECHO MEAS - ESV(CUBED): 39.3 ML
BH CV ECHO MEAS - ESV(MOD-SP4): 67.2 ML
BH CV ECHO MEAS - FS: 29.2 %
BH CV ECHO MEAS - IVS/LVPW: 1 CM
BH CV ECHO MEAS - IVSD: 0.9 CM
BH CV ECHO MEAS - LA DIMENSION: 3.6 CM
BH CV ECHO MEAS - LAT PEAK E' VEL: 12.9 CM/SEC
BH CV ECHO MEAS - LV DIASTOLIC VOL/BSA (35-75): 60 CM2
BH CV ECHO MEAS - LV MASS(C)D: 147.8 GRAMS
BH CV ECHO MEAS - LV MAX PG: 4.2 MMHG
BH CV ECHO MEAS - LV MEAN PG: 2 MMHG
BH CV ECHO MEAS - LV SYSTOLIC VOL/BSA (12-30): 32 CM2
BH CV ECHO MEAS - LV V1 MAX: 102 CM/SEC
BH CV ECHO MEAS - LV V1 VTI: 21.4 CM
BH CV ECHO MEAS - LVIDD: 4.8 CM
BH CV ECHO MEAS - LVIDS: 3.4 CM
BH CV ECHO MEAS - LVOT AREA: 2.8 CM2
BH CV ECHO MEAS - LVOT DIAM: 1.9 CM
BH CV ECHO MEAS - LVPWD: 0.9 CM
BH CV ECHO MEAS - MED PEAK E' VEL: 11 CM/SEC
BH CV ECHO MEAS - MR MAX PG: 36.7 MMHG
BH CV ECHO MEAS - MR MAX VEL: 303 CM/SEC
BH CV ECHO MEAS - MV A MAX VEL: 68.1 CM/SEC
BH CV ECHO MEAS - MV DEC SLOPE: 201 CM/SEC2
BH CV ECHO MEAS - MV DEC TIME: 0.19 MSEC
BH CV ECHO MEAS - MV E MAX VEL: 72.9 CM/SEC
BH CV ECHO MEAS - MV E/A: 1.07
BH CV ECHO MEAS - MV MAX PG: 3 MMHG
BH CV ECHO MEAS - MV MEAN PG: 1 MMHG
BH CV ECHO MEAS - MV P1/2T: 118.6 MSEC
BH CV ECHO MEAS - MV V2 VTI: 25.2 CM
BH CV ECHO MEAS - MVA(P1/2T): 1.85 CM2
BH CV ECHO MEAS - MVA(VTI): 2.41 CM2
BH CV ECHO MEAS - PA ACC TIME: 0.15 SEC
BH CV ECHO MEAS - PA PR(ACCEL): 12.4 MMHG
BH CV ECHO MEAS - PA V2 MAX: 107.5 CM/SEC
BH CV ECHO MEAS - RV MAX PG: 1.84 MMHG
BH CV ECHO MEAS - RV V1 MAX: 67.9 CM/SEC
BH CV ECHO MEAS - RV V1 VTI: 15.4 CM
BH CV ECHO MEAS - RVDD: 4.1 CM
BH CV ECHO MEAS - SI(MOD-SP4): 28 ML/M2
BH CV ECHO MEAS - SV(LVOT): 60.7 ML
BH CV ECHO MEAS - SV(MOD-SP4): 58.8 ML
BH CV ECHO MEAS - TAPSE (>1.6): 2.29 CM
BH CV ECHO MEAS - TR MAX PG: 37.2 MMHG
BH CV ECHO MEAS - TR MAX VEL: 305 CM/SEC
BH CV ECHO MEASUREMENTS AVERAGE E/E' RATIO: 6.1
BH CV XLRA - TDI S': 13.7 CM/SEC
LEFT ATRIUM VOLUME INDEX: 23.7 ML/M2
MAXIMAL PREDICTED HEART RATE: 186 BPM
SINUS: 2.4 CM
STJ: 2.3 CM
STRESS TARGET HR: 158 BPM

## 2023-03-04 DIAGNOSIS — F90.2 ATTENTION DEFICIT HYPERACTIVITY DISORDER (ADHD), COMBINED TYPE: ICD-10-CM

## 2023-03-17 ENCOUNTER — DOCUMENTATION (OUTPATIENT)
Dept: PHYSICAL THERAPY | Facility: CLINIC | Age: 33
End: 2023-03-17
Payer: COMMERCIAL

## 2023-03-17 NOTE — PROGRESS NOTES
Discharge Summary  Discharge Summary from Physical Therapy Report      Dates  PT visit: 12/15/22-12/29/22  Number of Visits: 3     Goals: Not Met    Discharge Plan: Patient to return to referring/providing physician    Comments : Pt did not have resolution or improvement of symptoms with vestibular tx, therefore she was to return to MD regarding further testing. She has been seen at multiple offices, and does not require more vestibular PT at this time. Pt not present for discharge, therefore no functional measures taken. See last note for most updated information.      Date of Discharge 3/17/23        Sherron Alfaro, PT  Physical Therapist

## 2023-03-29 ENCOUNTER — OFFICE VISIT (OUTPATIENT)
Dept: CARDIOLOGY | Facility: CLINIC | Age: 33
End: 2023-03-29
Payer: COMMERCIAL

## 2023-03-29 VITALS
RESPIRATION RATE: 18 BRPM | DIASTOLIC BLOOD PRESSURE: 80 MMHG | BODY MASS INDEX: 40.63 KG/M2 | HEIGHT: 64 IN | HEART RATE: 87 BPM | SYSTOLIC BLOOD PRESSURE: 120 MMHG | WEIGHT: 238 LBS

## 2023-03-29 DIAGNOSIS — R20.0 NUMBNESS AND TINGLING OF BOTH FEET: ICD-10-CM

## 2023-03-29 DIAGNOSIS — I95.1 AUTONOMIC ORTHOSTATIC HYPOTENSION: ICD-10-CM

## 2023-03-29 DIAGNOSIS — R00.2 PALPITATIONS: Primary | ICD-10-CM

## 2023-03-29 DIAGNOSIS — R20.2 NUMBNESS AND TINGLING OF BOTH FEET: ICD-10-CM

## 2023-03-29 DIAGNOSIS — R55 NEAR SYNCOPE: ICD-10-CM

## 2023-03-29 RX ORDER — FLUTICASONE FUROATE, UMECLIDINIUM BROMIDE AND VILANTEROL TRIFENATATE 200; 62.5; 25 UG/1; UG/1; UG/1
POWDER RESPIRATORY (INHALATION) DAILY
COMMUNITY
Start: 2023-03-01

## 2023-03-29 RX ORDER — BENRALIZUMAB 30 MG/ML
INJECTION, SOLUTION SUBCUTANEOUS
COMMUNITY
Start: 2023-03-23

## 2023-03-30 NOTE — PROGRESS NOTES
Cardiology Clinic Note  Shakir Villanueva MD, PhD    Subjective:     Encounter Date:03/29/2023      Patient ID: Binta Frazier is a 32 y.o. female.    Chief Complaint:  Chief Complaint   Patient presents with   • Follow-up       HPI:    I the pleasure to see this very pleasant 32-year-old female for dizziness and palpitations especially with positional change.  EKG is normal with normal conduction.  No history of congenital heart history or early CAD or heart failure.  She has some mild lower extremity edema, she has severe plaque psoriasis on immunomodulatory therapy, she has had some syncopal episodes in the past mostly with positional change having difficulty with standing and walking at times but symptoms do wax and wane.  Her heart rate is very regular and she notices higher heart rates when she stands up as well as exerts herself out of proportion.  Her resting heart rate is in the 70s to 80s which is normal with again with normal EKG.  She has no murmurs on exam, no history of any vascular disease.  Her symptoms are more consistent with orthostatic hypotension versus POTS.  She is not on midodrine or Florinef and she is trying to stay hydrated but again symptoms wax and wane and varying severities in duration.    She had extensive work-up with echo, tilt table and mobile outpatient telemetry.  Echo revealed normal EF with normal cardiac structure and function, normal RV size and function, no significant valvular abnormality, tilt table demonstrated some orthostatic symptoms, no overt syncope, no bradycardia or vasovagal symptoms.  Telemetry tracings reviewed demonstrated sinus rhythm and sinus tachycardia with no abnormal tacky or bradycardia arrhythmias, no pauses.  She is reassured by these findings but still continues to have positional dizziness mostly with standing or bending over.  Encouraged hydration, she has not started midodrine yet which we would recommend mainly early in the day, including nocturnal  "dosing for any supine hypertension, discontinue for any headaches.     Review of systems otherwise negative x14 point review of systems except was mentioned above        Historical data copied forward from previous encounters in EMR including the history, exam, and assessment/plan has been reviewed and is unchanged unless noted otherwise.     Cardiac medicines reviewed with risk, benefits, and necessity of each discussed.     Risk and benefit of cardiac testing reviewed including death heart attack stroke pain bleeding infection need for vascular /cardiovascular surgery were discussed and the patient      Objective:         Objective          /88 Comment: at home  Pulse 88   Resp 18   Ht 162.6 cm (64\")   Wt 108 kg (237 lb)   BMI 40.68 kg/m²      Physical Exam  Regular rate and rhythm no rubs murmurs or gallops next no heave no lift  No clubbing cyanosis or edema  OB soft nontender nondistended  Clear to auscultation  Skin is warm and dry  Neurologically intact grossly  Normocephalic/atraumatic pupils equal round  No carotid bruits or JVD  Assessment:         Assessment             Symptoms with palpitations, dizziness lightheadedness and syncope  Consistent with orthostatic hypotension versus POTS  Tilt table unremarkable for vasovagal symptoms, did have some orthostasis with dizziness with positional change  Most dizziness with standing not really with head changes, unlikely to have improvement with meclizine  Continue midodrine 5 3 times daily as needed  Avoid dehydration  Retraining with exercise would be recommended, avoid recombinant status  2D echo for completeness and structural functional evaluation normal cardiac structure and function normal EF, normal RV, no significant valvular abnormality, EF 60%  30-day event monitor and rhythm analysis did not demonstrate any tacky or bradycardia arrhythmias with only sinus rhythm throughout the study and sinus tachycardia      Prior work-up with labs was " "unremarkable as well as neurology, pulmonology, vascular with history of Raynaud's type phenomenon in her hands and feet  Diet and weight loss per AHA guidelines  Recommend 3-5 times weekly exercise, avoid supine positioning which would make orthostasis worse  See her back in 6 months, primary care in the interim as well as neurology    Pretty benign cardiac work-up at this point     Shakir Villanueva MD, PhD      Historical data copied forward from previous encounters in EMR including the history, exam, and assessment/plan has been reviewed and is unchanged unless noted otherwise.    Cardiac medicines reviewed with risk, benefits, and necessity of each discussed.    Risk and benefit of cardiac testing reviewed including death heart attack stroke pain bleeding infection need for vascular /cardiovascular surgery were discussed and the patient     Objective:         /80 Comment: at home  Pulse 87   Resp 18   Ht 162.6 cm (64\")   Wt 108 kg (238 lb)   BMI 40.85 kg/m²     The pleasure to be involved in this patient's cardiovascular care.  Please call with any questions or concerns  Shakir Villanueva MD, PhD    Most recent EKG as reviewed and interpreted by me:  Procedures     Most recent echo as reviewed and interpreted by me:  Results for orders placed during the hospital encounter of 02/23/23    Adult Transthoracic Echo Complete W/ Cont if Necessary Per Protocol    Interpretation Summary  •  Left ventricular systolic function is low normal. Left ventricular ejection fraction appears to be 51 - 55%.  •  Left ventricular diastolic function was normal.  •  Estimated right ventricular systolic pressure from tricuspid regurgitation is normal (<35 mmHg).      Most recent stress test as reviewed and interpreted by me:      Most recent cardiac catheterization as reviewed interpreted by me:  No results found for this or any previous visit.    The following portions of the patient's history were reviewed and updated as " appropriate: allergies, current medications, past family history, past medical history, past social history, past surgical history and problem list.      ROS:  14 point review of systems negative except as mentioned above    Current Outpatient Medications:   •  Benralizumab (Fasenra) 30 MG/ML solution prefilled syringe, Every 8 weeks., Disp: , Rfl:   •  cetirizine (zyrTEC) 10 MG tablet, Take 1 tablet by mouth Daily., Disp: , Rfl:   •  Cyanocobalamin (Nascobal) 500 MCG/0.1ML solution, Every 7 (Seven) Days., Disp: , Rfl:   •  Dapsone 7.5 % gel, ACZONE 7.5 % GEL, Disp: , Rfl:   •  doxycycline (ORACEA) 40 MG capsule, Every Morning., Disp: , Rfl:   •  Guselkumab (Tremfya) 100 MG/ML solution pen-injector, Every 30 (Thirty) Days., Disp: , Rfl:   •  linaclotide (LINZESS) 145 MCG capsule capsule, Every Morning Before Breakfast., Disp: , Rfl:   •  lisdexamfetamine (VYVANSE) 40 MG capsule, Take 1 capsule by mouth Every Morning for 30 days, Disp: 30 capsule, Rfl: 0  •  multivitamin (THERAGRAN) tablet tablet, Take 1 tablet by mouth Daily., Disp: , Rfl:   •  Nurtec 75 MG dispersible tablet, TAKE ONE (1) TABLET BY MOUTH EVERY DAY AS NEEDED FOR MIGRAINE FOR UP TO EIGHT (8) DAYS, Disp: , Rfl:   •  pantoprazole (PROTONIX) 40 MG EC tablet, TAKE ONE (1) TABLET BY MOUTH EVERY MORNING, Disp: , Rfl:   •  Vortioxetine HBr (Trintellix) 10 MG tablet tablet, Take 1 tablet by mouth Daily With Breakfast., Disp: 90 tablet, Rfl: 1  •  midodrine (PROAMATINE) 5 MG tablet, Take 1 tablet by mouth 3 (Three) Times a Day As Needed (dizziness). (Patient not taking: Reported on 3/29/2023), Disp: 90 tablet, Rfl: 2  •  Trelegy Ellipta 200-62.5-25 MCG/ACT aerosol powder , Daily., Disp: , Rfl:     Problem List:  Patient Active Problem List   Diagnosis   • Preventative health care   • Attention deficit hyperactivity disorder (ADHD), combined type   • Anxiety and depression   • Plaque psoriasis   • Prediabetes   • Acne   • Irritable bowel syndrome with  constipation   • Leg swelling   • Numbness and tingling of both feet   • Polyarteritis nodosa (HCC)   • Dizziness   • Weight gain   • Dry cough   • Palpitations   • Near syncope   • Autonomic orthostatic hypotension     Past Medical History:  Past Medical History:   Diagnosis Date   • ADHD (attention deficit hyperactivity disorder) 2010    Diagnosed in college   • Allergic 1990    mainly seasonal   • Anxiety 2020   • Asthma 1995    used breathing treatments in elementary school   • Colon polyp 2021    small, non-cancerous   • Depression 2007   • Diarrhea 08/22/2019   • Eating disorder 2007    therapy to address in high school and college   • GERD (gastroesophageal reflux disease) 2000    acid reflux since elementary school   • Headache    • Inflammatory bowel disease 2021    last scope in 2021 found erosion in large intestine   • Irritable bowel syndrome 2007   • Nausea 08/22/2019   • Peptic ulceration 2018    stomach ulcers, has since healed   • Plaque psoriasis    • RUQ pain 08/22/2019     Past Surgical History:  Past Surgical History:   Procedure Laterality Date   • COLONOSCOPY  2007, 2021    have had several, cannot remember years   • EYE SURGERY  2017    LASIK   • WISDOM TOOTH EXTRACTION       Social History:  Social History     Socioeconomic History   • Marital status: Single   Tobacco Use   • Smoking status: Never   • Smokeless tobacco: Never   Vaping Use   • Vaping Use: Never used   Substance and Sexual Activity   • Alcohol use: Yes     Comment: social drinker, maybe 1-2 drinks per month   • Drug use: Never   • Sexual activity: Not Currently     Partners: Male     Birth control/protection: None     Comment: about 2 months ago stopped taking pill     Allergies:  Allergies   Allergen Reactions   • Ciprofibrate Anaphylaxis   • Ciprofloxacin Hives, Shortness Of Breath and Swelling   • Sulfa Antibiotics Anaphylaxis   • Benzocaine Unknown - High Severity     Referring to long recovery from inhaled anaesthetic, not  topical   • Topiramate Itching     Itching all over, including inside mouth.  No breathing issues, no hives.     Immunizations:  Immunization History   Administered Date(s) Administered   • COVID-19 (PFIZER) PURPLE CAP 12/31/2020, 01/21/2021, 11/05/2021   • Flu Vaccine Quad PF 6-35MO 10/19/2019   • FluLaval/Fluzone >6mos 10/19/2019, 10/06/2022   • Flublok 18+yrs 10/28/2020   • Hep B, Adolescent or Pediatric 06/30/2005   • Hepatitis A 05/05/2018   • Meningococcal MCV4P (Menactra) 01/30/2007   • Pneumococcal Conjugate 13-Valent (PCV13) 11/18/2017   • flucelvax quad pfs =>4 YRS 11/18/2017            In-Office Procedure(s):  No orders to display        ASCVD RIsk Score::  The ASCVD Risk score (Cris MONTOYA, et al., 2019) failed to calculate for the following reasons:    The 2019 ASCVD risk score is only valid for ages 40 to 79    Imaging:         Results for orders placed during the hospital encounter of 08/22/19    US Gallbladder    Narrative  US GALLBLADDER-    Date of Exam: 8/22/2019 8:53 AM    Indication: RUQ ABD PAIN; R10.11-Right upper quadrant pain.    Comparison: None available.    Technique: Transverse and sagittal ultrasound images of the right upper  quadrant were obtained. Doppler evaluation was also conducted.    FINDINGS: The visualized portions the pancreas appear unremarkable. The  liver demonstrates normal homogeneous echotexture without focal  abnormality. The liver size is normal, 15.3 cm in length. No ascites is  seen. Intrahepatic IVC has a normal grayscale appearance. The imaged  portal veins and straight color flow. Portal vein is patent with  hepatopedal flow. No ascites is evident.    The gallbladder is free of shadowing stone, sludge, wall thickening or  pericholecystic fluid. The extrahepatic bile duct caliber is normal, 3  mm. No intrahepatic biliary ductal dilation is seen.    The right kidney measures 12 cm in length without focal cortical lesion,  shadowing stone or  hydronephrosis.    Impression  Normal right upper quadrant ultrasound examination.    Electronically Signed By-Dr. Lorraine Contreras MD On:8/22/2019 11:35 AM  This report was finalized on [id number] by Dr. Lorraine Contreras MD.          Lab Review:   Hospital Outpatient Visit on 02/23/2023   Component Date Value   • Target HR (85%) 02/23/2023 158    • Max. Pred. HR (100%) 02/23/2023 186    • LVIDd 02/23/2023 4.8    • LVIDs 02/23/2023 3.4    • IVSd 02/23/2023 0.90    • LVPWd 02/23/2023 0.90    • FS 02/23/2023 29.2    • IVS/LVPW 02/23/2023 1.00    • ESV(cubed) 02/23/2023 39.3    • LV Sys Vol (BSA correcte* 02/23/2023 32.0    • EDV(cubed) 02/23/2023 110.6    • LV Jon Vol (BSA correct* 02/23/2023 60.0    • LVOT area 02/23/2023 2.8    • LV mass(C)d 02/23/2023 147.8    • LVOT diam 02/23/2023 1.90    • EDV(MOD-sp4) 02/23/2023 126.0    • ESV(MOD-sp4) 02/23/2023 67.2    • SV(MOD-sp4) 02/23/2023 58.8    • SI(MOD-sp4) 02/23/2023 28.0    • EF(MOD-sp4) 02/23/2023 46.7    • MV E max emerson 02/23/2023 72.9    • MV A max emerson 02/23/2023 68.1    • MV dec time 02/23/2023 0.19    • MV E/A 02/23/2023 1.07    • LA ESV Index (BP) 02/23/2023 23.7    • Med Peak E' Emerson 02/23/2023 11.0    • Lat Peak E' Emerson 02/23/2023 12.9    • Avg E/e' ratio 02/23/2023 6.10    • SV(LVOT) 02/23/2023 60.7    • RVIDd 02/23/2023 4.1    • TAPSE (>1.6) 02/23/2023 2.29    • RV S' 02/23/2023 13.7    • LA dimension (2D)  02/23/2023 3.6    • LV V1 max 02/23/2023 102.0    • LV V1 max PG 02/23/2023 4.2    • LV V1 mean PG 02/23/2023 2.00    • LV V1 VTI 02/23/2023 21.4    • Ao pk emerson 02/23/2023 134.0    • Ao max PG 02/23/2023 7.2    • Ao mean PG 02/23/2023 4.0    • Ao V2 VTI 02/23/2023 28.0    • AVEL(I,D) 02/23/2023 2.17    • MV max PG 02/23/2023 3.0    • MV mean PG 02/23/2023 1.00    • MV V2 VTI 02/23/2023 25.2    • MV P1/2t 02/23/2023 118.6    • MVA(P1/2t) 02/23/2023 1.85    • MVA(VTI) 02/23/2023 2.41    • MV dec slope 02/23/2023 201.0    • MR max emerson 02/23/2023 303.0    • MR  max PG 02/23/2023 36.7    • TR max emerson 02/23/2023 305.0    • TR max PG 02/23/2023 37.2    • RV V1 max PG 02/23/2023 1.84    • RV V1 max 02/23/2023 67.9    • RV V1 VTI 02/23/2023 15.4    • PA V2 max 02/23/2023 107.5    • PA acc time 02/23/2023 0.15    • PA pr(Accel) 02/23/2023 12.4    • ACS 02/23/2023 1.90    • Sinus 02/23/2023 2.40    • STJ 02/23/2023 2.30    Hospital Outpatient Visit on 02/08/2023   Component Date Value   • Target HR (85%) 02/08/2023 158    • Max. Pred. HR (100%) 02/08/2023 186    • Tilt Agent Used 02/08/2023 SL Nitroglycerin    Ancillary Procedure on 02/08/2023   Component Date Value   • Target HR (85%) 02/21/2023 158    • Max. Pred. HR (100%) 02/21/2023 186    Office Visit on 10/06/2022   Component Date Value   • WBC 10/06/2022 7.14    • RBC 10/06/2022 4.95    • Hemoglobin 10/06/2022 13.3    • Hematocrit 10/06/2022 40.3    • MCV 10/06/2022 81.4    • MCH 10/06/2022 26.9    • MCHC 10/06/2022 33.0    • RDW 10/06/2022 12.9    • RDW-SD 10/06/2022 38.2    • MPV 10/06/2022 9.8    • Platelets 10/06/2022 305    • Glucose 10/06/2022 92    • BUN 10/06/2022 16    • Creatinine 10/06/2022 0.75    • Sodium 10/06/2022 139    • Potassium 10/06/2022 4.4    • Chloride 10/06/2022 103    • CO2 10/06/2022 24.0    • Calcium 10/06/2022 9.6    • BUN/Creatinine Ratio 10/06/2022 21.3    • Anion Gap 10/06/2022 12.0    • eGFR 10/06/2022 108.0    • Hemoglobin A1C 10/06/2022 5.6      Recent labs reviewed and interpreted for clinical significance and application            Level of Care:           Shakir Villanueva MD  03/30/23  .

## 2023-04-06 ENCOUNTER — OFFICE VISIT (OUTPATIENT)
Dept: FAMILY MEDICINE CLINIC | Facility: CLINIC | Age: 33
End: 2023-04-06
Payer: COMMERCIAL

## 2023-04-06 VITALS
SYSTOLIC BLOOD PRESSURE: 142 MMHG | WEIGHT: 234 LBS | HEIGHT: 64 IN | OXYGEN SATURATION: 99 % | DIASTOLIC BLOOD PRESSURE: 80 MMHG | HEART RATE: 97 BPM | BODY MASS INDEX: 39.95 KG/M2

## 2023-04-06 DIAGNOSIS — K58.1 IRRITABLE BOWEL SYNDROME WITH CONSTIPATION: ICD-10-CM

## 2023-04-06 DIAGNOSIS — F32.A ANXIETY AND DEPRESSION: ICD-10-CM

## 2023-04-06 DIAGNOSIS — J45.50 SEVERE PERSISTENT ASTHMA WITHOUT COMPLICATION: ICD-10-CM

## 2023-04-06 DIAGNOSIS — F90.2 ATTENTION DEFICIT HYPERACTIVITY DISORDER (ADHD), COMBINED TYPE: ICD-10-CM

## 2023-04-06 DIAGNOSIS — R73.03 PREDIABETES: ICD-10-CM

## 2023-04-06 DIAGNOSIS — N92.6 IRREGULAR MENSTRUAL CYCLE: ICD-10-CM

## 2023-04-06 DIAGNOSIS — L40.0 PLAQUE PSORIASIS: ICD-10-CM

## 2023-04-06 DIAGNOSIS — I95.1 AUTONOMIC ORTHOSTATIC HYPOTENSION: Primary | ICD-10-CM

## 2023-04-06 DIAGNOSIS — F41.9 ANXIETY AND DEPRESSION: ICD-10-CM

## 2023-04-06 PROCEDURE — 84403 ASSAY OF TOTAL TESTOSTERONE: CPT | Performed by: NURSE PRACTITIONER

## 2023-04-06 PROCEDURE — 82627 DEHYDROEPIANDROSTERONE: CPT | Performed by: NURSE PRACTITIONER

## 2023-04-06 PROCEDURE — 84402 ASSAY OF FREE TESTOSTERONE: CPT | Performed by: NURSE PRACTITIONER

## 2023-04-06 PROCEDURE — 84146 ASSAY OF PROLACTIN: CPT | Performed by: NURSE PRACTITIONER

## 2023-04-06 PROCEDURE — 83036 HEMOGLOBIN GLYCOSYLATED A1C: CPT | Performed by: NURSE PRACTITIONER

## 2023-04-06 PROCEDURE — 83002 ASSAY OF GONADOTROPIN (LH): CPT | Performed by: NURSE PRACTITIONER

## 2023-04-06 PROCEDURE — 83525 ASSAY OF INSULIN: CPT | Performed by: NURSE PRACTITIONER

## 2023-04-06 PROCEDURE — 83001 ASSAY OF GONADOTROPIN (FSH): CPT | Performed by: NURSE PRACTITIONER

## 2023-04-06 RX ORDER — MECLIZINE HYDROCHLORIDE 25 MG/1
25 TABLET ORAL 3 TIMES DAILY PRN
Qty: 30 TABLET | Refills: 0 | Status: SHIPPED | OUTPATIENT
Start: 2023-04-06

## 2023-04-06 NOTE — ASSESSMENT & PLAN NOTE
1.  Cardiology notes reviewed  2.  I will start meclizine for her to use as needed for dizziness  3.  Call with new or worsening concerns

## 2023-04-06 NOTE — PROGRESS NOTES
Chief Complaint  Follow-up (3 month follow up depression/ADHD (needing refill on vyvanse)/Follow up from dizziness/leg swelling )    Chance Frazier presents to [facility]  History of Present Illness    Patient presents for follow-up visit.    Patient is taking Trintellix 10mg daily for depression and anxiety.  She is taking Vyvanse 40 mg daily for ADHD. Symptoms are stable, has no acute complaints.     Patient seen previously with complaints of persistent dizziness, intermittent swelling to bilateral hands and feet that is accompanied with discoloration and neuropathy.GISELLE was ordered and patient was referred to rheumatology.  Labs were unremarkable.  GISELLE showed normal digital pressures. Patient seen by Rheumatology but labs were inconclusive, although there are markers for an autoimmune vascular disease. Rheumatology referred to Vascular due to neuropathy, who reportedly feels that symptoms are autoimmune in nature.  She was seen again by Dr. Gupta, did not feel that symptoms/labs significant enough for treatment. Patient was evaluated by neurology, advised that symptoms are suspicious for benign paroxysmal positional vertigo.  She was referred for vestibular evaluation, completed without improvement. Patient evaluated by Dr. Villanueva, advised she does not have POTS but does have orthostasis. Patient has episodes of heart racing, dizziness and near syncope. She was given Midodrine but has not started due to borderline hypertension.     Patient has chronic cough with previous pulmonary work-up that was unremarkable. Rheumatology referred back to Pulmonology. PFT completed and was normal. Labs also ordered. She was seen by Dr. Boggs, diagnosed with severe asthma with high eosinophils - started on Fasenra which she will receive every 8 weeks.     Patient also has IBS with constipation. Patient is on Linzess, taking as prescribed. She does also have a gluten sensitivity.  "Last colonoscopy 2021, found polyps and erosion in large intestine. Patient will repeat in June 2023. She is followed by I.    Patient has plaque psoriasis, followed by [facility].  She is taking Tremfya injections. Rheumatology contacted Dermatology and advised discontinuation of Enbrel due to possible exacerbation of vascular symptoms.     Patient reports she is averaging 17-25 days of menstrual cycles. She has monitored ovulation, does not ovulate. Patient's sister recently diagnosed with PCOS. She has always been on birth control to regulate, stopped taking a couple of months ago. Patient has gained weight since stopping oral contraception.     Objective   Vital Signs:  /80 (BP Location: Left arm, Patient Position: Sitting, Cuff Size: Large Adult)   Pulse 97   Ht 162.6 cm (64\")   Wt 106 kg (234 lb)   SpO2 99%   BMI 40.17 kg/m²   Estimated body mass index is 40.17 kg/m² as calculated from the following:    Height as of this encounter: 162.6 cm (64\").    Weight as of this encounter: 106 kg (234 lb).       Class 3 Severe Obesity (BMI >=40). Obesity-related health conditions include the following: hypertension and diabetes mellitus. Obesity is unchanged. BMI is is above average; BMI management plan is completed. We discussed portion control, increasing exercise and management of depression/anxiety/stress to control compensatory eating.      Physical Exam  Constitutional:       Appearance: Normal appearance.   HENT:      Head: Normocephalic.   Cardiovascular:      Rate and Rhythm: Normal rate and regular rhythm.   Pulmonary:      Effort: Pulmonary effort is normal.      Breath sounds: Normal breath sounds.   Abdominal:      General: Abdomen is flat. Bowel sounds are normal.      Palpations: Abdomen is soft.   Musculoskeletal:         General: Normal range of motion.      Cervical back: Neck supple.      Right lower leg: No edema.      Left lower leg: No edema.   Skin:     General: Skin is warm " and dry.   Neurological:      Mental Status: She is alert and oriented to person, place, and time.      Gait: Gait is intact.   Psychiatric:         Attention and Perception: Attention normal.         Mood and Affect: Mood normal.         Speech: Speech normal.        Result Review :    CMP    CMP 6/15/22 7/6/22 10/6/22   Glucose 112 (A) 106 (A) 92   BUN 14 13 16   Creatinine 0.75 0.71 0.75   eGFR 108.0 115.3 108.0   Sodium 139 136 139   Potassium 4.6 3.9 4.4   Chloride 104 102 103   Calcium 9.2 9.0 9.6   Total Protein 6.7     Albumin 4.30     Globulin 2.4     Total Bilirubin 0.3     Alkaline Phosphatase 124 (A)     AST (SGOT) 12     ALT (SGPT) 16     Albumin/Globulin Ratio 1.8     BUN/Creatinine Ratio 18.7 18.3 21.3   Anion Gap 10.0 10.0 12.0   (A) Abnormal value       Comments are available for some flowsheets but are not being displayed.           CBC    CBC 6/15/22 10/6/22 2/22/23   WBC 8.58 7.14 10.33   RBC 4.68 4.95 4.91   Hemoglobin 12.9 13.3 12.8   Hematocrit 38.7 40.3 40.6   MCV 82.7 81.4 82.7   MCH 27.6 26.9 26.1   MCHC 33.3 33.0 31.5   RDW 12.7 12.9 13.3   Platelets 316 305 302               TSH    TSH 6/15/22   TSH 1.810           Most Recent A1C    HGBA1C Most Recent 10/6/22   Hemoglobin A1C 5.6                        Assessment and Plan   Diagnoses and all orders for this visit:    1. Autonomic orthostatic hypotension (Primary)  Assessment & Plan:  1.  Cardiology notes reviewed  2.  I will start meclizine for her to use as needed for dizziness  3.  Call with new or worsening concerns      2. Prediabetes  Assessment & Plan:  1.  A1c and insulin to be drawn today    Orders:  -     Hemoglobin A1c    3. Irritable bowel syndrome with constipation  Assessment & Plan:  1.  Continue Linzess as prescribed  2.  Follow-up with GI as instructed      4. Attention deficit hyperactivity disorder (ADHD), combined type  Assessment & Plan:  1.  Stable on Vyvanse 40 mg daily    Orders:  -     lisdexamfetamine (VYVANSE)  40 MG capsule; Take 1 capsule by mouth Every Morning for 30 days  Dispense: 30 capsule; Refill: 0    5. Irregular menstrual cycle  Assessment & Plan:  1.  Labs today for further work-up regarding PCOS  2.  Discussed use of oral contraception and metformin    Orders:  -     DHEA-sulfate  -     Follicle stimulating hormone  -     Luteinizing hormone  -     Prolactin  -     Testosterone (Free & Total), LC / MS  -     Insulin, Total    6. Plaque psoriasis  Assessment & Plan:  1.  Tremfya per dermatology      7. Anxiety and depression  Assessment & Plan:  1.  Stable on Trintellix 10 mg daily      8. Severe persistent asthma without complication  Assessment & Plan:  1.  Fasenra per pulmonology          Other orders  -     Vortioxetine HBr (Trintellix) 10 MG tablet tablet; Take 1 tablet by mouth Daily With Breakfast.  Dispense: 90 tablet; Refill: 1  -     meclizine (ANTIVERT) 25 MG tablet; Take 1 tablet by mouth 3 (Three) Times a Day As Needed for Dizziness.  Dispense: 30 tablet; Refill: 0         I spent 30 minutes caring for Binta on this date of service. This time includes time spent by me in the following activities:preparing for the visit, reviewing tests, obtaining and/or reviewing a separately obtained history, performing a medically appropriate examination and/or evaluation , counseling and educating the patient/family/caregiver, ordering medications, tests, or procedures, documenting information in the medical record, independently interpreting results and communicating that information with the patient/family/caregiver and care coordination  Follow Up   Return in about 3 months (around 7/6/2023) for ADHD.  Patient was given instructions and counseling regarding her condition or for health maintenance advice. Please see specific information pulled into the AVS if appropriate.       Answers for HPI/ROS submitted by the patient on 4/5/2023  Please describe your symptoms.: Continued symptoms - light headed, dizzy,  swelling in legs  Have you had these symptoms before?: Yes  How long have you been having these symptoms?: Greater than 2 weeks  What is the primary reason for your visit?: Other

## 2023-04-06 NOTE — ASSESSMENT & PLAN NOTE
1.  Labs today for further work-up regarding PCOS  2.  Discussed use of oral contraception and metformin

## 2023-04-06 NOTE — PROGRESS NOTES
Venipuncture Blood Specimen Collection  Venipuncture performed in the right arm by Tamika Muller MA with good hemostasis. Patient tolerated the procedure well without complications.   04/06/23   Tamika Muller MA

## 2023-04-07 LAB
FSH SERPL-ACNC: 8.74 MIU/ML
HBA1C MFR BLD: 5.4 % (ref 4.8–5.6)
LH SERPL-ACNC: 33.2 MIU/ML
PROLACTIN SERPL-MCNC: 23.6 NG/ML (ref 4.79–23.3)

## 2023-04-08 LAB
DHEA-S SERPL-MCNC: 98.9 UG/DL (ref 84.8–378)
INSULIN SERPL-ACNC: 118 UIU/ML (ref 2.6–24.9)

## 2023-04-13 LAB
TESTOST FREE SERPL-MCNC: 0.7 PG/ML (ref 0–4.2)
TESTOST SERPL-MCNC: 15.6 NG/DL (ref 10–55)

## 2023-05-04 DIAGNOSIS — F90.2 ATTENTION DEFICIT HYPERACTIVITY DISORDER (ADHD), COMBINED TYPE: ICD-10-CM

## 2023-06-05 DIAGNOSIS — F90.2 ATTENTION DEFICIT HYPERACTIVITY DISORDER (ADHD), COMBINED TYPE: ICD-10-CM

## 2023-06-12 ENCOUNTER — CLINICAL SUPPORT (OUTPATIENT)
Dept: FAMILY MEDICINE CLINIC | Facility: CLINIC | Age: 33
End: 2023-06-12
Payer: COMMERCIAL

## 2023-06-12 DIAGNOSIS — Z30.09 BIRTH CONTROL COUNSELING: Primary | ICD-10-CM

## 2023-06-12 LAB
B-HCG UR QL: NEGATIVE
EXPIRATION DATE: ABNORMAL
INTERNAL NEGATIVE CONTROL: NEGATIVE
INTERNAL POSITIVE CONTROL: POSITIVE
Lab: ABNORMAL

## 2023-06-12 RX ORDER — NORGESTIMATE AND ETHINYL ESTRADIOL 7DAYSX3 LO
1 KIT ORAL DAILY
Qty: 28 TABLET | Refills: 12 | Status: SHIPPED | OUTPATIENT
Start: 2023-06-12 | End: 2024-06-11

## 2023-06-12 NOTE — PROGRESS NOTES
Patient came in for urine pregnancy test as she is wanting to start birth control. Urine sample was obtained and dipped. Results sent to provider to review

## 2023-07-24 ENCOUNTER — OFFICE VISIT (OUTPATIENT)
Dept: FAMILY MEDICINE CLINIC | Facility: CLINIC | Age: 33
End: 2023-07-24
Payer: COMMERCIAL

## 2023-07-24 VITALS
SYSTOLIC BLOOD PRESSURE: 142 MMHG | HEART RATE: 91 BPM | WEIGHT: 232 LBS | BODY MASS INDEX: 39.61 KG/M2 | DIASTOLIC BLOOD PRESSURE: 80 MMHG | OXYGEN SATURATION: 99 % | HEIGHT: 64 IN

## 2023-07-24 DIAGNOSIS — J45.50 SEVERE PERSISTENT ASTHMA WITHOUT COMPLICATION: Primary | ICD-10-CM

## 2023-07-24 DIAGNOSIS — F41.9 ANXIETY AND DEPRESSION: ICD-10-CM

## 2023-07-24 DIAGNOSIS — I95.1 AUTONOMIC ORTHOSTATIC HYPOTENSION: ICD-10-CM

## 2023-07-24 DIAGNOSIS — F90.2 ATTENTION DEFICIT HYPERACTIVITY DISORDER (ADHD), COMBINED TYPE: ICD-10-CM

## 2023-07-24 DIAGNOSIS — E28.2 PCOS (POLYCYSTIC OVARIAN SYNDROME): ICD-10-CM

## 2023-07-24 DIAGNOSIS — F32.A ANXIETY AND DEPRESSION: ICD-10-CM

## 2023-07-24 PROCEDURE — 99213 OFFICE O/P EST LOW 20 MIN: CPT | Performed by: NURSE PRACTITIONER

## 2023-07-24 NOTE — PROGRESS NOTES
"Chief Complaint  Follow-up (3 month follow up ADHD)    Subjective        Binta Frazier presents to [facility]  History of Present Illness    Patient presents for follow-up visit.    Patient has PCOS - restarted OCP and Metformin. She reports menstrual cycles are stabilizing, bleeding is more controlled. Patient also reports improvement in appetite, denies cravings. She reports, \"I feel so much better.\"     Patient seen previously with complaints of persistent dizziness, intermittent swelling to bilateral hands and feet that is accompanied with discoloration and neuropathy.GISELLE was ordered and patient was referred to rheumatology.  Labs were unremarkable.  GISELLE showed normal digital pressures. Patient seen by Rheumatology but labs were inconclusive, although there are markers for an autoimmune vascular disease. Rheumatology referred to Vascular due to neuropathy, who reportedly feels that symptoms are autoimmune in nature.  She was seen again by Dr. Gupta, did not feel that symptoms/labs significant enough for treatment. Patient was evaluated by neurology, advised that symptoms are suspicious for benign paroxysmal positional vertigo.  She was referred for vestibular evaluation, completed without improvement. Patient evaluated by Dr. Villanueva, advised she does not have POTS but does have orthostasis. Patient has intermittent episodes of heart racing, dizziness and near syncope. She will see Dr. Villanueva in October.      Patient has chronic cough r/t severe asthma with high eosinophils.  PFT completed and was normal. She is followed by Dr. Boggs, started on Fasenra - receiving every 8 weeks. Patient also using Trelegy 1 puff daily. She reports coughing is well controlled at this time, denies dyspnea or wheezing.     Patient is taking Trintellix 10mg daily for depression and anxiety.  She is taking Vyvanse 40 mg daily for ADHD. Symptoms are stable, has no acute complaints.      Objective " "  Vital Signs:  /80 (BP Location: Left arm, Patient Position: Sitting, Cuff Size: Large Adult)   Pulse 91   Ht 162.6 cm (64\")   Wt 105 kg (232 lb)   SpO2 99%   BMI 39.82 kg/m²   Estimated body mass index is 39.82 kg/m² as calculated from the following:    Height as of this encounter: 162.6 cm (64\").    Weight as of this encounter: 105 kg (232 lb).           Physical Exam  Constitutional:       Appearance: Normal appearance.   HENT:      Head: Normocephalic.   Cardiovascular:      Rate and Rhythm: Normal rate and regular rhythm.   Pulmonary:      Effort: Pulmonary effort is normal.      Breath sounds: Normal breath sounds.   Abdominal:      General: Abdomen is flat. Bowel sounds are normal.      Palpations: Abdomen is soft.   Musculoskeletal:         General: Normal range of motion.      Cervical back: Neck supple.      Right lower leg: No edema.      Left lower leg: No edema.   Skin:     General: Skin is warm and dry.   Neurological:      Mental Status: She is alert and oriented to person, place, and time.      Gait: Gait is intact.   Psychiatric:         Attention and Perception: Attention normal.         Mood and Affect: Mood normal.         Speech: Speech normal.      Result Review :    CMP          10/6/2022    08:36   CMP   Glucose 92    BUN 16    Creatinine 0.75    EGFR 108.0    Sodium 139    Potassium 4.4    Chloride 103    Calcium 9.6    BUN/Creatinine Ratio 21.3    Anion Gap 12.0      CBC          10/6/2022    08:36 2/22/2023    08:56   CBC   WBC 7.14  10.33       RBC 4.95  4.91       Hemoglobin 13.3  12.8       Hematocrit 40.3  40.6       MCV 81.4  82.7       MCH 26.9  26.1       MCHC 33.0  31.5       RDW 12.9  13.3       Platelets 305  302          Details          This result is from an external source.                 Most Recent A1C          4/6/2023    08:55   HGBA1C Most Recent   Hemoglobin A1C 5.40                   Assessment and Plan   Diagnoses and all orders for this visit:    1. " Severe persistent asthma without complication (Primary)  Assessment & Plan:  Continue Trelegy and Fasenra per pulmonology          2. Autonomic orthostatic hypotension  Assessment & Plan:  Symptoms are variable  -patient is not taking midodrine due to borderline hypertension.  She is to follow-up with cardiology as scheduled      3. Attention deficit hyperactivity disorder (ADHD), combined type  Assessment & Plan:  Stable on Vyvanse 40 mg daily  Follow-up in 4 months, sooner if needed      4. Anxiety and depression  Assessment & Plan:  Stable on Trintellix 10 mg daily      5. PCOS (polycystic ovarian syndrome)  Assessment & Plan:  Symptoms improving, continue metformin and daily OCP             I spent 25 minutes caring for Binta on this date of service. This time includes time spent by me in the following activities:preparing for the visit, reviewing tests, obtaining and/or reviewing a separately obtained history, performing a medically appropriate examination and/or evaluation , counseling and educating the patient/family/caregiver, ordering medications, tests, or procedures, documenting information in the medical record, and care coordination  Follow Up   Return in about 4 months (around 11/24/2023) for ADHD.  Patient was given instructions and counseling regarding her condition or for health maintenance advice. Please see specific information pulled into the AVS if appropriate.

## 2023-07-24 NOTE — ASSESSMENT & PLAN NOTE
Symptoms are variable  -patient is not taking midodrine due to borderline hypertension.  She is to follow-up with cardiology as scheduled

## 2023-08-08 DIAGNOSIS — F90.2 ATTENTION DEFICIT HYPERACTIVITY DISORDER (ADHD), COMBINED TYPE: ICD-10-CM

## 2023-09-10 DIAGNOSIS — F90.2 ATTENTION DEFICIT HYPERACTIVITY DISORDER (ADHD), COMBINED TYPE: ICD-10-CM

## 2023-10-08 DIAGNOSIS — F90.2 ATTENTION DEFICIT HYPERACTIVITY DISORDER (ADHD), COMBINED TYPE: ICD-10-CM

## 2023-10-09 RX ORDER — LISDEXAMFETAMINE DIMESYLATE CAPSULES 40 MG/1
40 CAPSULE ORAL EVERY MORNING
Qty: 30 CAPSULE | Refills: 0 | Status: SHIPPED | OUTPATIENT
Start: 2023-10-09 | End: 2023-11-08

## 2023-11-04 DIAGNOSIS — F90.2 ATTENTION DEFICIT HYPERACTIVITY DISORDER (ADHD), COMBINED TYPE: ICD-10-CM

## 2023-11-06 RX ORDER — LISDEXAMFETAMINE DIMESYLATE CAPSULES 40 MG/1
40 CAPSULE ORAL EVERY MORNING
Qty: 30 CAPSULE | Refills: 0 | Status: SHIPPED | OUTPATIENT
Start: 2023-11-06 | End: 2023-12-06

## 2023-11-14 DIAGNOSIS — F90.2 ATTENTION DEFICIT HYPERACTIVITY DISORDER (ADHD), COMBINED TYPE: ICD-10-CM

## 2023-11-15 DIAGNOSIS — F90.2 ATTENTION DEFICIT HYPERACTIVITY DISORDER (ADHD), COMBINED TYPE: Primary | ICD-10-CM

## 2023-11-15 RX ORDER — LISDEXAMFETAMINE DIMESYLATE 40 MG/1
CAPSULE ORAL
Qty: 30 CAPSULE | Refills: 0 | OUTPATIENT
Start: 2023-11-15

## 2023-11-15 RX ORDER — LISDEXAMFETAMINE DIMESYLATE 40 MG/1
40 CAPSULE ORAL EVERY MORNING
Qty: 30 CAPSULE | Refills: 0 | Status: SHIPPED | OUTPATIENT
Start: 2023-11-15

## 2023-11-27 ENCOUNTER — OFFICE VISIT (OUTPATIENT)
Dept: FAMILY MEDICINE CLINIC | Facility: CLINIC | Age: 33
End: 2023-11-27
Payer: COMMERCIAL

## 2023-11-27 VITALS
SYSTOLIC BLOOD PRESSURE: 132 MMHG | HEART RATE: 89 BPM | DIASTOLIC BLOOD PRESSURE: 80 MMHG | WEIGHT: 228 LBS | OXYGEN SATURATION: 98 % | HEIGHT: 64 IN | BODY MASS INDEX: 38.93 KG/M2

## 2023-11-27 DIAGNOSIS — K58.1 IRRITABLE BOWEL SYNDROME WITH CONSTIPATION: ICD-10-CM

## 2023-11-27 DIAGNOSIS — L40.0 PLAQUE PSORIASIS: ICD-10-CM

## 2023-11-27 DIAGNOSIS — F90.2 ATTENTION DEFICIT HYPERACTIVITY DISORDER (ADHD), COMBINED TYPE: ICD-10-CM

## 2023-11-27 DIAGNOSIS — J34.89 SINUS DRAINAGE: ICD-10-CM

## 2023-11-27 DIAGNOSIS — F32.A ANXIETY AND DEPRESSION: ICD-10-CM

## 2023-11-27 DIAGNOSIS — F41.9 ANXIETY AND DEPRESSION: ICD-10-CM

## 2023-11-27 DIAGNOSIS — E28.2 PCOS (POLYCYSTIC OVARIAN SYNDROME): Primary | ICD-10-CM

## 2023-11-27 DIAGNOSIS — J45.50 SEVERE PERSISTENT ASTHMA WITHOUT COMPLICATION: ICD-10-CM

## 2023-11-27 DIAGNOSIS — I95.1 AUTONOMIC ORTHOSTATIC HYPOTENSION: ICD-10-CM

## 2023-11-27 PROCEDURE — 99214 OFFICE O/P EST MOD 30 MIN: CPT | Performed by: NURSE PRACTITIONER

## 2023-11-27 NOTE — ASSESSMENT & PLAN NOTE
Continue oral contraception and metformin as prescribed  Patient plans to meet with Ashley Hernandez PP for GYN

## 2023-11-27 NOTE — PROGRESS NOTES
Chief Complaint  Follow-up (4 month follow up ADHD) and Sinusitis (Sinus drainage with yellow mucus production started last week )    Subjective        Binta Frazier presents to [facility]  History of Present Illness    Patient presents for follow-up visit.    Patient is taking Trintellix 10mg daily for depression and anxiety.  She is taking Vyvanse 40 mg daily for ADHD. Symptoms are stable, has no acute complaints.       Patient has chronic cough r/t severe asthma with high eosinophils.  PFT completed and was normal. She is followed by Dr. Boggs,  - receiving Fasenra every 8 weeks. Patient also using Trelegy 1 puff daily. She reports coughing is well controlled at this time, denies dyspnea or wheezing.  Patient is complaining of sinus drainage and productive cough with yellow mucus. She denies pressure/pain, fever or chills.     Patient has PCOS - on OCP and Metformin. She reports menstrual cycles are stable. Patient also reports improvement in appetite, denies cravings.     Patient seen previously with complaints of persistent dizziness, intermittent swelling to bilateral hands and feet that is accompanied with discoloration and neuropathy.GISELLE was ordered and patient was referred to rheumatology.  Labs were unremarkable.  GISELLE showed normal digital pressures. Patient seen by Rheumatology but labs were inconclusive, although there are markers for an autoimmune vascular disease. Rheumatology referred to Vascular due to neuropathy, who reportedly feels that symptoms are autoimmune in nature.  She was seen again by Dr. Gupta, did not feel that symptoms/labs significant enough for treatment. Patient was evaluated by neurology, advised that symptoms are suspicious for benign paroxysmal positional vertigo.  She was referred for vestibular evaluation, completed without improvement. Patient evaluated by Dr. Villanueva, advised she does not have POTS but does have orthostasis. Patient has  "intermittent episodes of heart racing, dizziness and near syncope.     Patient has IBS with constipation. Patient is on Linzess 145 mcg daily, taking as prescribed. She does also have a gluten sensitivity. Last colonoscopy 2021, found polyps and erosion in large intestine. She is followed by [facility].     Patient has plaque psoriasis, followed by [facility].  She is taking Tremfya injections.     Objective   Vital Signs:  /80 (BP Location: Left arm, Patient Position: Sitting, Cuff Size: Large Adult)   Pulse 89   Ht 162.6 cm (64\")   Wt 103 kg (228 lb)   SpO2 98%   BMI 39.14 kg/m²   Estimated body mass index is 39.14 kg/m² as calculated from the following:    Height as of this encounter: 162.6 cm (64\").    Weight as of this encounter: 103 kg (228 lb).          Physical Exam  Constitutional:       Appearance: Normal appearance. She is obese.   HENT:      Head: Normocephalic.   Cardiovascular:      Rate and Rhythm: Normal rate and regular rhythm.   Pulmonary:      Effort: Pulmonary effort is normal.      Breath sounds: Normal breath sounds.   Abdominal:      General: Abdomen is flat. Bowel sounds are normal.      Palpations: Abdomen is soft.   Musculoskeletal:         General: Normal range of motion.      Cervical back: Neck supple.      Right lower leg: No edema.      Left lower leg: No edema.   Skin:     General: Skin is warm and dry.   Neurological:      Mental Status: She is alert and oriented to person, place, and time.      Gait: Gait is intact.   Psychiatric:         Attention and Perception: Attention normal.         Mood and Affect: Mood normal.         Speech: Speech normal.        Result Review :      CBC          2/22/2023    08:56   CBC   WBC 10.33       RBC 4.91       Hemoglobin 12.8       Hematocrit 40.6       MCV 82.7       MCH 26.1       MCHC 31.5       RDW 13.3       Platelets 302          Details          This result is from an external source.                 Most Recent A1C          " 4/6/2023    08:55   HGBA1C Most Recent   Hemoglobin A1C 5.40      Data reviewed : Consultant notes Pulmonology             Assessment and Plan   Diagnoses and all orders for this visit:    1. PCOS (polycystic ovarian syndrome) (Primary)  Assessment & Plan:  Continue oral contraception and metformin as prescribed  Patient plans to meet with Ashley Hernandez PP for GYN      2. Autonomic orthostatic hypotension  Assessment & Plan:  Followed by Cardiology      3. Irritable bowel syndrome with constipation  Assessment & Plan:  Stable on Linzess, follow-up with GI      4. Anxiety and depression  Assessment & Plan:  Stable on Trintellix 10 mg daily      5. Attention deficit hyperactivity disorder (ADHD), combined type  Assessment & Plan:  Stable on Vyvanse 40 mg daily      6. Severe persistent asthma without complication  Assessment & Plan:  Fasenra per Pulmonology  Continue Trelegy          7. Plaque psoriasis  Assessment & Plan:  Tremfya injections per dermatology      8. Sinus drainage  Comments:  1.  Nasal saline irrigation  2.  Daily Claritin or Zyrtec as needed  3.  Develops sinus pain, pressure or symptoms persist greater than 5 to 7 days -call office    Other orders  -     Vortioxetine HBr (Trintellix) 10 MG tablet tablet; Take 1 tablet by mouth Daily With Breakfast.  Dispense: 90 tablet; Refill: 1           I spent 30 minutes caring for Binta on this date of service. This time includes time spent by me in the following activities:preparing for the visit, reviewing tests, obtaining and/or reviewing a separately obtained history, performing a medically appropriate examination and/or evaluation , counseling and educating the patient/family/caregiver, ordering medications, tests, or procedures, documenting information in the medical record, and care coordination  Follow Up   Return in about 4 months (around 3/27/2024) for ADHD.  Patient was given instructions and counseling regarding her condition or for health  maintenance advice. Please see specific information pulled into the AVS if appropriate.

## 2023-11-28 ENCOUNTER — PATIENT MESSAGE (OUTPATIENT)
Dept: FAMILY MEDICINE CLINIC | Facility: CLINIC | Age: 33
End: 2023-11-28
Payer: COMMERCIAL

## 2023-12-05 DIAGNOSIS — F90.2 ATTENTION DEFICIT HYPERACTIVITY DISORDER (ADHD), COMBINED TYPE: ICD-10-CM

## 2023-12-05 RX ORDER — LISDEXAMFETAMINE DIMESYLATE CAPSULES 40 MG/1
40 CAPSULE ORAL EVERY MORNING
Qty: 30 CAPSULE | Refills: 0 | Status: SHIPPED | OUTPATIENT
Start: 2023-12-05 | End: 2024-01-04

## 2024-01-02 DIAGNOSIS — F90.2 ATTENTION DEFICIT HYPERACTIVITY DISORDER (ADHD), COMBINED TYPE: ICD-10-CM

## 2024-01-03 RX ORDER — LISDEXAMFETAMINE DIMESYLATE CAPSULES 40 MG/1
40 CAPSULE ORAL EVERY MORNING
Qty: 30 CAPSULE | Refills: 0 | Status: SHIPPED | OUTPATIENT
Start: 2024-01-03 | End: 2024-02-02

## 2024-02-11 DIAGNOSIS — F90.2 ATTENTION DEFICIT HYPERACTIVITY DISORDER (ADHD), COMBINED TYPE: ICD-10-CM

## 2024-02-12 RX ORDER — LISDEXAMFETAMINE DIMESYLATE CAPSULES 40 MG/1
40 CAPSULE ORAL EVERY MORNING
Qty: 30 CAPSULE | Refills: 0 | Status: SHIPPED | OUTPATIENT
Start: 2024-02-12 | End: 2024-03-13

## 2024-03-11 DIAGNOSIS — F90.2 ATTENTION DEFICIT HYPERACTIVITY DISORDER (ADHD), COMBINED TYPE: ICD-10-CM

## 2024-03-12 RX ORDER — LISDEXAMFETAMINE DIMESYLATE CAPSULES 40 MG/1
40 CAPSULE ORAL EVERY MORNING
Qty: 30 CAPSULE | Refills: 0 | Status: SHIPPED | OUTPATIENT
Start: 2024-03-12 | End: 2024-04-11

## 2024-03-26 ENCOUNTER — OFFICE VISIT (OUTPATIENT)
Dept: FAMILY MEDICINE CLINIC | Facility: CLINIC | Age: 34
End: 2024-03-26
Payer: COMMERCIAL

## 2024-03-26 VITALS
DIASTOLIC BLOOD PRESSURE: 88 MMHG | HEART RATE: 95 BPM | OXYGEN SATURATION: 99 % | SYSTOLIC BLOOD PRESSURE: 134 MMHG | BODY MASS INDEX: 38.93 KG/M2 | WEIGHT: 228 LBS | HEIGHT: 64 IN

## 2024-03-26 DIAGNOSIS — L40.0 PLAQUE PSORIASIS: ICD-10-CM

## 2024-03-26 DIAGNOSIS — F32.A ANXIETY AND DEPRESSION: ICD-10-CM

## 2024-03-26 DIAGNOSIS — I95.1 AUTONOMIC ORTHOSTATIC HYPOTENSION: ICD-10-CM

## 2024-03-26 DIAGNOSIS — E28.2 PCOS (POLYCYSTIC OVARIAN SYNDROME): ICD-10-CM

## 2024-03-26 DIAGNOSIS — R73.03 PREDIABETES: ICD-10-CM

## 2024-03-26 DIAGNOSIS — H11.32 SUBCONJUNCTIVAL HEMORRHAGE OF LEFT EYE: Primary | ICD-10-CM

## 2024-03-26 DIAGNOSIS — K58.1 IRRITABLE BOWEL SYNDROME WITH CONSTIPATION: ICD-10-CM

## 2024-03-26 DIAGNOSIS — J45.50 SEVERE PERSISTENT ASTHMA WITHOUT COMPLICATION: ICD-10-CM

## 2024-03-26 DIAGNOSIS — F41.9 ANXIETY AND DEPRESSION: ICD-10-CM

## 2024-03-26 DIAGNOSIS — Z00.00 PREVENTATIVE HEALTH CARE: ICD-10-CM

## 2024-03-26 DIAGNOSIS — F90.2 ATTENTION DEFICIT HYPERACTIVITY DISORDER (ADHD), COMBINED TYPE: ICD-10-CM

## 2024-03-26 PROCEDURE — 80050 GENERAL HEALTH PANEL: CPT | Performed by: NURSE PRACTITIONER

## 2024-03-26 PROCEDURE — 86803 HEPATITIS C AB TEST: CPT | Performed by: NURSE PRACTITIONER

## 2024-03-26 PROCEDURE — 83036 HEMOGLOBIN GLYCOSYLATED A1C: CPT | Performed by: NURSE PRACTITIONER

## 2024-03-26 PROCEDURE — 80061 LIPID PANEL: CPT | Performed by: NURSE PRACTITIONER

## 2024-03-26 NOTE — PROGRESS NOTES
Venipuncture Blood Specimen Collection  Venipuncture performed in the right arm by Tamika Muller MA with good hemostasis. Patient tolerated the procedure well without complications.   03/26/24   Tamika Muller MA

## 2024-03-26 NOTE — PROGRESS NOTES
Chief Complaint  Follow-up (4 month follow up ADHD )    Chance Frazier presents to [facility]  History of Present Illness    Patient presents for f/u visit.     Patient has redness to left eye, consistent with subconjunctival hemorrhage. She reports occurring bilaterally, spontaneously. Patient has had eye exam, o/w normal. She denies excessive coughing, vomiting, straining or other known causes.     Patient is taking Trintellix 10mg daily for depression and anxiety.  She is taking Vyvanse 40 mg daily for ADHD. Symptoms are stable, has no acute complaints.        Patient has chronic cough r/t severe asthma with high eosinophils.  PFT completed and was normal. She is followed by Dr. Boggs, Pulmonology - receiving Fasenra every 8 weeks. Patient also using Trelegy 1 puff daily. She reports coughing is well controlled at this time, denies dyspnea or wheezing.      Patient has PCOS - on OCP and Metformin 500 mg daily. She reports menstrual cycles are stable.      Patient seen previously with complaints of persistent dizziness, intermittent swelling to bilateral hands and feet that is accompanied with discoloration and neuropathy. GISELLE was ordered and patient was referred to rheumatology.  Labs were unremarkable.  GISELLE showed normal digital pressures. Patient seen by Rheumatology but labs were inconclusive, although there are markers for an autoimmune vascular disease. Rheumatology referred to Vascular due to neuropathy, who reportedly feels that symptoms are autoimmune in nature.  She was seen again by Dr. Gupta, did not feel that symptoms/labs significant enough for treatment. Patient was evaluated by neurology, advised that symptoms are suspicious for benign paroxysmal positional vertigo.  She was referred for vestibular evaluation, completed without improvement. Patient evaluated by Dr. Villanueva, advised she does not have POTS but does have orthostasis. Patient has  "intermittent episodes of heart racing, dizziness and near syncope.      Patient has IBS with constipation. Patient is on Linzess 145 mcg daily, taking as prescribed. She does also have a gluten sensitivity. Last colonoscopy 2021, found polyps and erosion in large intestine. She is followed by [facility].      Patient has plaque psoriasis, followed by [facility].  She is taking Tremfya injections.        Objective   Vital Signs:  /88 (BP Location: Left arm, Patient Position: Sitting, Cuff Size: Large Adult)   Pulse 95   Ht 162.6 cm (64\")   Wt 103 kg (228 lb)   SpO2 99%   BMI 39.14 kg/m²   Estimated body mass index is 39.14 kg/m² as calculated from the following:    Height as of this encounter: 162.6 cm (64\").    Weight as of this encounter: 103 kg (228 lb).           Physical Exam  Constitutional:       Appearance: Normal appearance.   HENT:      Head: Normocephalic.   Eyes:      Conjunctiva/sclera:      Left eye: Hemorrhage present.   Cardiovascular:      Rate and Rhythm: Normal rate and regular rhythm.   Pulmonary:      Effort: Pulmonary effort is normal.      Breath sounds: Normal breath sounds.   Abdominal:      General: Abdomen is flat. Bowel sounds are normal.      Palpations: Abdomen is soft.   Musculoskeletal:         General: Normal range of motion.      Cervical back: Neck supple.      Right lower leg: No edema.      Left lower leg: No edema.   Skin:     General: Skin is warm and dry.   Neurological:      Mental Status: She is alert and oriented to person, place, and time.      Gait: Gait is intact.   Psychiatric:         Attention and Perception: Attention normal.         Mood and Affect: Mood normal.         Speech: Speech normal.        Result Review :      CMP          3/26/2024    11:48   CMP   Glucose 119    BUN 11    Creatinine 0.85    EGFR 91.8    Sodium 136    Potassium 4.1    Chloride 102    Calcium 9.0    Total Protein 7.0    Albumin 4.0    Globulin 3.0    Total Bilirubin <0.2  "   Alkaline Phosphatase 113    AST (SGOT) 9    ALT (SGPT) 11    Albumin/Globulin Ratio 1.3    BUN/Creatinine Ratio 12.9    Anion Gap 12.0      CBC          3/26/2024    11:48   CBC   WBC 9.26    RBC 4.80    Hemoglobin 12.6    Hematocrit 38.6    MCV 80.4    MCH 26.3    MCHC 32.6    RDW 13.0    Platelets 404      Lipid Panel          3/26/2024    11:48   Lipid Panel   Total Cholesterol 199    Triglycerides 196    HDL Cholesterol 51    VLDL Cholesterol 34    LDL Cholesterol  114    LDL/HDL Ratio 2.13      TSH          3/26/2024    11:48   TSH   TSH 2.630      Most Recent A1C          3/26/2024    11:48   HGBA1C Most Recent   Hemoglobin A1C 5.70                   Assessment and Plan     Diagnoses and all orders for this visit:    1. Subconjunctival hemorrhage of left eye (Primary)  Comments:  1. Labs today  2. if normal, discuss with Rheumatology and f/u with ophthalmology    2. PCOS (polycystic ovarian syndrome)  Assessment & Plan:  Stable on OCP and Metformin      3. Attention deficit hyperactivity disorder (ADHD), combined type  Assessment & Plan:  Stable on Vyvanse 40 mg daily      4. Autonomic orthostatic hypotension  Assessment & Plan:  Follow by Cardiology      5. Irritable bowel syndrome with constipation  Assessment & Plan:  Continue Linzess per GI, follow up as instructed      6. Anxiety and depression  Assessment & Plan:  Stable on Trintellix 10 mg daily      7. Severe persistent asthma without complication  Assessment & Plan:  Continue Fasenra and Trelegy per Pulmonology            8. Plaque psoriasis  Assessment & Plan:  Tremfya per Dermatology      9. Prediabetes  -     Comprehensive Metabolic Panel  -     Hemoglobin A1c    10. Preventative health care  -     CBC (No Diff)  -     Comprehensive Metabolic Panel  -     Hemoglobin A1c  -     Lipid Panel  -     TSH  -     Hepatitis C Antibody    Other orders  -     metFORMIN (GLUCOPHAGE) 500 MG tablet; Take 1 tablet by mouth Daily With Breakfast.  Dispense: 90  tablet; Refill: 1           I spent 30 minutes caring for Binta on this date of service. This time includes time spent by me in the following activities:preparing for the visit, reviewing tests, obtaining and/or reviewing a separately obtained history, performing a medically appropriate examination and/or evaluation , counseling and educating the patient/family/caregiver, ordering medications, tests, or procedures, documenting information in the medical record, and care coordination  Follow Up     Return in about 4 months (around 7/26/2024) for ADHD.  Patient was given instructions and counseling regarding her condition or for health maintenance advice. Please see specific information pulled into the AVS if appropriate.

## 2024-03-27 LAB
ALBUMIN SERPL-MCNC: 4 G/DL (ref 3.5–5.2)
ALBUMIN/GLOB SERPL: 1.3 G/DL
ALP SERPL-CCNC: 113 U/L (ref 39–117)
ALT SERPL W P-5'-P-CCNC: 11 U/L (ref 1–33)
ANION GAP SERPL CALCULATED.3IONS-SCNC: 12 MMOL/L (ref 5–15)
AST SERPL-CCNC: 9 U/L (ref 1–32)
BILIRUB SERPL-MCNC: <0.2 MG/DL (ref 0–1.2)
BUN SERPL-MCNC: 11 MG/DL (ref 6–20)
BUN/CREAT SERPL: 12.9 (ref 7–25)
CALCIUM SPEC-SCNC: 9 MG/DL (ref 8.6–10.5)
CHLORIDE SERPL-SCNC: 102 MMOL/L (ref 98–107)
CHOLEST SERPL-MCNC: 199 MG/DL (ref 0–200)
CO2 SERPL-SCNC: 22 MMOL/L (ref 22–29)
CREAT SERPL-MCNC: 0.85 MG/DL (ref 0.57–1)
DEPRECATED RDW RBC AUTO: 37 FL (ref 37–54)
EGFRCR SERPLBLD CKD-EPI 2021: 91.8 ML/MIN/1.73
ERYTHROCYTE [DISTWIDTH] IN BLOOD BY AUTOMATED COUNT: 13 % (ref 12.3–15.4)
GLOBULIN UR ELPH-MCNC: 3 GM/DL
GLUCOSE SERPL-MCNC: 119 MG/DL (ref 65–99)
HBA1C MFR BLD: 5.7 % (ref 4.8–5.6)
HCT VFR BLD AUTO: 38.6 % (ref 34–46.6)
HCV AB SER DONR QL: NORMAL
HDLC SERPL-MCNC: 51 MG/DL (ref 40–60)
HGB BLD-MCNC: 12.6 G/DL (ref 12–15.9)
LDLC SERPL CALC-MCNC: 114 MG/DL (ref 0–100)
LDLC/HDLC SERPL: 2.13 {RATIO}
MCH RBC QN AUTO: 26.3 PG (ref 26.6–33)
MCHC RBC AUTO-ENTMCNC: 32.6 G/DL (ref 31.5–35.7)
MCV RBC AUTO: 80.4 FL (ref 79–97)
PLATELET # BLD AUTO: 404 10*3/MM3 (ref 140–450)
PMV BLD AUTO: 9.5 FL (ref 6–12)
POTASSIUM SERPL-SCNC: 4.1 MMOL/L (ref 3.5–5.2)
PROT SERPL-MCNC: 7 G/DL (ref 6–8.5)
RBC # BLD AUTO: 4.8 10*6/MM3 (ref 3.77–5.28)
SODIUM SERPL-SCNC: 136 MMOL/L (ref 136–145)
TRIGL SERPL-MCNC: 196 MG/DL (ref 0–150)
TSH SERPL DL<=0.05 MIU/L-ACNC: 2.63 UIU/ML (ref 0.27–4.2)
VLDLC SERPL-MCNC: 34 MG/DL (ref 5–40)
WBC NRBC COR # BLD AUTO: 9.26 10*3/MM3 (ref 3.4–10.8)

## 2024-03-28 PROBLEM — H11.32 SUBCONJUNCTIVAL HEMORRHAGE OF LEFT EYE: Status: ACTIVE | Noted: 2024-03-28

## 2024-04-10 ENCOUNTER — TRANSCRIBE ORDERS (OUTPATIENT)
Dept: ADMINISTRATIVE | Facility: HOSPITAL | Age: 34
End: 2024-04-10
Payer: COMMERCIAL

## 2024-04-10 ENCOUNTER — OFFICE (AMBULATORY)
Dept: URBAN - METROPOLITAN AREA CLINIC 64 | Facility: CLINIC | Age: 34
End: 2024-04-10

## 2024-04-10 VITALS
DIASTOLIC BLOOD PRESSURE: 99 MMHG | HEART RATE: 88 BPM | WEIGHT: 228 LBS | SYSTOLIC BLOOD PRESSURE: 146 MMHG | SYSTOLIC BLOOD PRESSURE: 136 MMHG | HEIGHT: 64 IN | DIASTOLIC BLOOD PRESSURE: 90 MMHG

## 2024-04-10 DIAGNOSIS — H47.11 PAPILLEDEMA ASSOCIATED WITH INCREASED INTRACRANIAL PRESSURE: Primary | ICD-10-CM

## 2024-04-10 DIAGNOSIS — R13.10 DYSPHAGIA, UNSPECIFIED: ICD-10-CM

## 2024-04-10 DIAGNOSIS — K21.9 GASTRO-ESOPHAGEAL REFLUX DISEASE WITHOUT ESOPHAGITIS: ICD-10-CM

## 2024-04-10 DIAGNOSIS — J82.83 EOSINOPHILIC ASTHMA: ICD-10-CM

## 2024-04-10 DIAGNOSIS — R14.0 ABDOMINAL DISTENSION (GASEOUS): ICD-10-CM

## 2024-04-10 DIAGNOSIS — G90.2 HORNER SYNDROME: Primary | ICD-10-CM

## 2024-04-10 PROCEDURE — 99213 OFFICE O/P EST LOW 20 MIN: CPT | Performed by: NURSE PRACTITIONER

## 2024-04-14 DIAGNOSIS — F90.2 ATTENTION DEFICIT HYPERACTIVITY DISORDER (ADHD), COMBINED TYPE: ICD-10-CM

## 2024-04-15 RX ORDER — LISDEXAMFETAMINE DIMESYLATE CAPSULES 40 MG/1
40 CAPSULE ORAL EVERY MORNING
Qty: 30 CAPSULE | Refills: 0 | Status: SHIPPED | OUTPATIENT
Start: 2024-04-15 | End: 2024-05-15

## 2024-04-16 ENCOUNTER — ON CAMPUS - OUTPATIENT (AMBULATORY)
Dept: URBAN - METROPOLITAN AREA HOSPITAL 2 | Facility: HOSPITAL | Age: 34
End: 2024-04-16

## 2024-04-16 ENCOUNTER — OFFICE (AMBULATORY)
Dept: URBAN - METROPOLITAN AREA PATHOLOGY 19 | Facility: PATHOLOGY | Age: 34
End: 2024-04-16

## 2024-04-16 VITALS
RESPIRATION RATE: 17 BRPM | WEIGHT: 225 LBS | OXYGEN SATURATION: 98 % | HEIGHT: 64 IN | HEART RATE: 69 BPM | OXYGEN SATURATION: 100 % | SYSTOLIC BLOOD PRESSURE: 141 MMHG | DIASTOLIC BLOOD PRESSURE: 106 MMHG | SYSTOLIC BLOOD PRESSURE: 153 MMHG | DIASTOLIC BLOOD PRESSURE: 92 MMHG | SYSTOLIC BLOOD PRESSURE: 162 MMHG | OXYGEN SATURATION: 99 % | TEMPERATURE: 98 F | HEART RATE: 78 BPM | HEART RATE: 88 BPM | RESPIRATION RATE: 18 BRPM | HEART RATE: 70 BPM | DIASTOLIC BLOOD PRESSURE: 115 MMHG | HEART RATE: 75 BPM | SYSTOLIC BLOOD PRESSURE: 142 MMHG | DIASTOLIC BLOOD PRESSURE: 99 MMHG

## 2024-04-16 DIAGNOSIS — K21.9 GASTRO-ESOPHAGEAL REFLUX DISEASE WITHOUT ESOPHAGITIS: ICD-10-CM

## 2024-04-16 DIAGNOSIS — R13.10 DYSPHAGIA, UNSPECIFIED: ICD-10-CM

## 2024-04-16 LAB
GI HISTOLOGY: A. MIDDLE THIRD OF THE ESOPHAGUS: (no result)
GI HISTOLOGY: PDF REPORT: (no result)

## 2024-04-16 PROCEDURE — 43450 DILATE ESOPHAGUS 1/MULT PASS: CPT | Performed by: INTERNAL MEDICINE

## 2024-04-16 PROCEDURE — 43239 EGD BIOPSY SINGLE/MULTIPLE: CPT | Performed by: INTERNAL MEDICINE

## 2024-04-16 PROCEDURE — 88305 TISSUE EXAM BY PATHOLOGIST: CPT | Performed by: PATHOLOGY

## 2024-05-09 RX ORDER — RIMEGEPANT SULFATE 75 MG/75MG
1 TABLET, ORALLY DISINTEGRATING ORAL DAILY PRN
Qty: 30 TABLET | Refills: 6 | Status: SHIPPED | OUTPATIENT
Start: 2024-05-09

## 2024-05-15 DIAGNOSIS — F90.2 ATTENTION DEFICIT HYPERACTIVITY DISORDER (ADHD), COMBINED TYPE: ICD-10-CM

## 2024-05-16 RX ORDER — LISDEXAMFETAMINE DIMESYLATE 40 MG/1
40 CAPSULE ORAL EVERY MORNING
Qty: 30 CAPSULE | Refills: 0 | Status: SHIPPED | OUTPATIENT
Start: 2024-05-16 | End: 2024-06-15

## 2024-05-31 RX ORDER — NORGESTIMATE AND ETHINYL ESTRADIOL
KIT
Qty: 28 TABLET | Refills: 12 | Status: SHIPPED | OUTPATIENT
Start: 2024-05-31

## 2024-06-11 DIAGNOSIS — F90.2 ATTENTION DEFICIT HYPERACTIVITY DISORDER (ADHD), COMBINED TYPE: ICD-10-CM

## 2024-06-11 RX ORDER — LISDEXAMFETAMINE DIMESYLATE 40 MG/1
40 CAPSULE ORAL EVERY MORNING
Qty: 30 CAPSULE | Refills: 0 | Status: SHIPPED | OUTPATIENT
Start: 2024-06-11 | End: 2024-07-11

## 2024-07-15 DIAGNOSIS — F90.2 ATTENTION DEFICIT HYPERACTIVITY DISORDER (ADHD), COMBINED TYPE: ICD-10-CM

## 2024-07-16 RX ORDER — LISDEXAMFETAMINE DIMESYLATE 40 MG/1
40 CAPSULE ORAL EVERY MORNING
Qty: 30 CAPSULE | Refills: 0 | Status: SHIPPED | OUTPATIENT
Start: 2024-07-16 | End: 2024-08-15

## 2024-07-30 ENCOUNTER — OFFICE VISIT (OUTPATIENT)
Dept: FAMILY MEDICINE CLINIC | Facility: CLINIC | Age: 34
End: 2024-07-30
Payer: COMMERCIAL

## 2024-07-30 VITALS
OXYGEN SATURATION: 100 % | HEART RATE: 85 BPM | WEIGHT: 216 LBS | BODY MASS INDEX: 36.88 KG/M2 | SYSTOLIC BLOOD PRESSURE: 128 MMHG | DIASTOLIC BLOOD PRESSURE: 70 MMHG | HEIGHT: 64 IN

## 2024-07-30 DIAGNOSIS — L40.0 PLAQUE PSORIASIS: ICD-10-CM

## 2024-07-30 DIAGNOSIS — F90.2 ATTENTION DEFICIT HYPERACTIVITY DISORDER (ADHD), COMBINED TYPE: Primary | ICD-10-CM

## 2024-07-30 DIAGNOSIS — E28.2 PCOS (POLYCYSTIC OVARIAN SYNDROME): ICD-10-CM

## 2024-07-30 DIAGNOSIS — I95.1 AUTONOMIC ORTHOSTATIC HYPOTENSION: ICD-10-CM

## 2024-07-30 DIAGNOSIS — R73.03 PREDIABETES: ICD-10-CM

## 2024-07-30 DIAGNOSIS — F41.9 ANXIETY AND DEPRESSION: ICD-10-CM

## 2024-07-30 DIAGNOSIS — F32.A ANXIETY AND DEPRESSION: ICD-10-CM

## 2024-07-30 DIAGNOSIS — G93.2 IDIOPATHIC INTRACRANIAL HYPERTENSION: ICD-10-CM

## 2024-07-30 DIAGNOSIS — K58.1 IRRITABLE BOWEL SYNDROME WITH CONSTIPATION: ICD-10-CM

## 2024-07-30 DIAGNOSIS — G43.009 MIGRAINE WITHOUT AURA AND WITHOUT STATUS MIGRAINOSUS, NOT INTRACTABLE: ICD-10-CM

## 2024-07-30 DIAGNOSIS — J45.50 SEVERE PERSISTENT ASTHMA WITHOUT COMPLICATION: ICD-10-CM

## 2024-07-30 PROCEDURE — 99214 OFFICE O/P EST MOD 30 MIN: CPT | Performed by: NURSE PRACTITIONER

## 2024-07-30 RX ORDER — ACETAZOLAMIDE 500 MG/1
1 CAPSULE, EXTENDED RELEASE ORAL EVERY 24 HOURS
COMMUNITY

## 2024-07-30 NOTE — ASSESSMENT & PLAN NOTE
Most recent A1c was 5.7  Metformin discontinued due to diarrhea  Continue limiting concentrated sweets and carbohydrate

## 2024-07-30 NOTE — PROGRESS NOTES
Chief Complaint  Follow-up (4 month follow up ADHD )    Chance Frazier presents to [facility]  Follow-up    Patient presents for f/u visit.     Patient is taking Trintellix 10mg daily for depression and anxiety.  She is taking Vyvanse 40 mg daily for ADHD. Symptoms are stable, has no acute complaints.       Patient has PCOS - on OCP. Metformin stopped due to diarrhea. She reports menstrual cycles are stable.     Patient has IBS with constipation. Patient is prescribed Linzess 145 mcg daily, stopped taking due to diarrhea. She does also have a gluten sensitivity. Last colonoscopy 2021, found polyps and erosion in large intestine. She is followed by I.     Patient has chronic cough r/t severe asthma with high eosinophils.  PFT completed and was normal. She is followed by Dr. Boggs,  - receiving Fasenra every 8 weeks. Patient also using Trelegy 1 puff daily.      Patient has plaque psoriasis, followed by [facility].  She is taking Tremfya injections.     Patient seen previously with complaints of persistent dizziness, intermittent swelling to bilateral hands and feet that is accompanied with discoloration and neuropathy. GISELLE was ordered and patient was referred to rheumatology.  Labs were unremarkable.  GISELLE showed normal digital pressures. Patient seen by Rheumatology but labs were inconclusive, although there are markers for an autoimmune vascular disease. Rheumatology referred to Vascular due to neuropathy, who reportedly feels that symptoms are autoimmune in nature.  She was seen again by Dr. Gupta, did not feel that symptoms/labs significant enough for treatment. Patient was evaluated by neurology, advised that symptoms are suspicious for benign paroxysmal positional vertigo.  She was referred for vestibular evaluation, completed without improvement. Patient evaluated by Dr. Villanueva, advised she does not have POTS but does have orthostasis. Patient has  "intermittent episodes of heart racing, dizziness and near syncope. She has since been diagnosed with intracranial hypertension - now taking Diamox.     Patient taking Nurtec PRN migraine headaches. She reports using approximately once weekly.     Objective   Vital Signs:  /70 (BP Location: Left arm, Patient Position: Sitting, Cuff Size: Large Adult)   Pulse 85   Ht 162.6 cm (64\")   Wt 98 kg (216 lb)   SpO2 100%   BMI 37.08 kg/m²   Estimated body mass index is 37.08 kg/m² as calculated from the following:    Height as of this encounter: 162.6 cm (64\").    Weight as of this encounter: 98 kg (216 lb).       Class 2 Severe Obesity (BMI >=35 and <=39.9). Obesity-related health conditions include the following: hypertension, dyslipidemias, and GERD. Obesity is improving with lifestyle modifications. BMI is is above average; BMI management plan is completed. We discussed portion control and increasing exercise.      Physical Exam  Constitutional:       Appearance: Normal appearance.   HENT:      Head: Normocephalic.   Cardiovascular:      Rate and Rhythm: Normal rate and regular rhythm.   Pulmonary:      Effort: Pulmonary effort is normal.      Breath sounds: Normal breath sounds.   Abdominal:      General: Abdomen is flat. Bowel sounds are normal.      Palpations: Abdomen is soft.   Musculoskeletal:         General: Normal range of motion.      Cervical back: Neck supple.      Right lower leg: No edema.      Left lower leg: No edema.   Skin:     General: Skin is warm and dry.   Neurological:      Mental Status: She is alert and oriented to person, place, and time.      Gait: Gait is intact.   Psychiatric:         Attention and Perception: Attention normal.         Mood and Affect: Mood normal.         Speech: Speech normal.        Result Review :      CMP          3/26/2024    11:48   CMP   Glucose 119    BUN 11    Creatinine 0.85    EGFR 91.8    Sodium 136    Potassium 4.1    Chloride 102    Calcium 9.0  "   Total Protein 7.0    Albumin 4.0    Globulin 3.0    Total Bilirubin <0.2    Alkaline Phosphatase 113    AST (SGOT) 9    ALT (SGPT) 11    Albumin/Globulin Ratio 1.3    BUN/Creatinine Ratio 12.9    Anion Gap 12.0      CBC          3/26/2024    11:48   CBC   WBC 9.26    RBC 4.80    Hemoglobin 12.6    Hematocrit 38.6    MCV 80.4    MCH 26.3    MCHC 32.6    RDW 13.0    Platelets 404      Lipid Panel          3/26/2024    11:48   Lipid Panel   Total Cholesterol 199    Triglycerides 196    HDL Cholesterol 51    VLDL Cholesterol 34    LDL Cholesterol  114    LDL/HDL Ratio 2.13      TSH          3/26/2024    11:48   TSH   TSH 2.630      Most Recent A1C          3/26/2024    11:48   HGBA1C Most Recent   Hemoglobin A1C 5.70      Data reviewed : Consultant notes Rheumatology             Assessment and Plan     Diagnoses and all orders for this visit:    1. Attention deficit hyperactivity disorder (ADHD), combined type (Primary)  Assessment & Plan:  Stable on Vyvanse 40 mg daily      2. PCOS (polycystic ovarian syndrome)  Assessment & Plan:  Stable on OCP      3. Autonomic orthostatic hypotension  Assessment & Plan:  Followed by cardiology      4. Plaque psoriasis  Assessment & Plan:  Tremfya per dermatology      5. Anxiety and depression  Assessment & Plan:  Stable on Trintellix 10 mg daily      6. Irritable bowel syndrome with constipation  Assessment & Plan:  Stable at this time, not currently taking Linzess      7. Prediabetes  Assessment & Plan:  Most recent A1c was 5.7  Metformin discontinued due to diarrhea  Continue limiting concentrated sweets and carbohydrate      8. Idiopathic intracranial hypertension  Assessment & Plan:  Continue Diamox per ophthalmology      9. Migraine without aura and without status migrainosus, not intractable  Assessment & Plan:  Nurtec as needed              10. Severe persistent asthma without complication  Assessment & Plan:  Continue Trelegy and Fasenra per Pulmonology            Other  orders  -     Vortioxetine HBr (Trintellix) 10 MG tablet tablet; Take 1 tablet by mouth Daily With Breakfast.  Dispense: 90 tablet; Refill: 1           I spent 30 minutes caring for Binta on this date of service. This time includes time spent by me in the following activities:preparing for the visit, reviewing tests, obtaining and/or reviewing a separately obtained history, performing a medically appropriate examination and/or evaluation , counseling and educating the patient/family/caregiver, ordering medications, tests, or procedures, documenting information in the medical record, and care coordination  Follow Up     Return in about 4 months (around 11/30/2024) for Annual physical.  Patient was given instructions and counseling regarding her condition or for health maintenance advice. Please see specific information pulled into the AVS if appropriate.

## 2024-08-11 DIAGNOSIS — F90.2 ATTENTION DEFICIT HYPERACTIVITY DISORDER (ADHD), COMBINED TYPE: ICD-10-CM

## 2024-08-12 DIAGNOSIS — G93.2 IDIOPATHIC INTRACRANIAL HYPERTENSION: Primary | ICD-10-CM

## 2024-08-12 RX ORDER — LISDEXAMFETAMINE DIMESYLATE 40 MG/1
40 CAPSULE ORAL EVERY MORNING
Qty: 30 CAPSULE | Refills: 0 | Status: SHIPPED | OUTPATIENT
Start: 2024-08-12 | End: 2024-09-11

## 2024-08-16 ENCOUNTER — LAB (OUTPATIENT)
Dept: FAMILY MEDICINE CLINIC | Facility: CLINIC | Age: 34
End: 2024-08-16
Payer: COMMERCIAL

## 2024-08-16 PROCEDURE — 80048 BASIC METABOLIC PNL TOTAL CA: CPT | Performed by: NURSE PRACTITIONER

## 2024-08-16 PROCEDURE — 36415 COLL VENOUS BLD VENIPUNCTURE: CPT | Performed by: NURSE PRACTITIONER

## 2024-08-17 LAB
ANION GAP SERPL CALCULATED.3IONS-SCNC: 9 MMOL/L (ref 5–15)
BUN SERPL-MCNC: 9 MG/DL (ref 6–20)
BUN/CREAT SERPL: 11 (ref 7–25)
CALCIUM SPEC-SCNC: 9.6 MG/DL (ref 8.6–10.5)
CHLORIDE SERPL-SCNC: 109 MMOL/L (ref 98–107)
CO2 SERPL-SCNC: 22 MMOL/L (ref 22–29)
CREAT SERPL-MCNC: 0.82 MG/DL (ref 0.57–1)
EGFRCR SERPLBLD CKD-EPI 2021: 95.8 ML/MIN/1.73
GLUCOSE SERPL-MCNC: 78 MG/DL (ref 65–99)
POTASSIUM SERPL-SCNC: 4.7 MMOL/L (ref 3.5–5.2)
SODIUM SERPL-SCNC: 140 MMOL/L (ref 136–145)

## 2024-09-13 DIAGNOSIS — F90.2 ATTENTION DEFICIT HYPERACTIVITY DISORDER (ADHD), COMBINED TYPE: ICD-10-CM

## 2024-09-13 RX ORDER — LISDEXAMFETAMINE DIMESYLATE 40 MG/1
40 CAPSULE ORAL EVERY MORNING
Qty: 30 CAPSULE | Refills: 0 | Status: SHIPPED | OUTPATIENT
Start: 2024-09-13 | End: 2024-10-13

## 2024-10-13 DIAGNOSIS — F90.2 ATTENTION DEFICIT HYPERACTIVITY DISORDER (ADHD), COMBINED TYPE: ICD-10-CM

## 2024-10-14 RX ORDER — LISDEXAMFETAMINE DIMESYLATE 40 MG/1
40 CAPSULE ORAL EVERY MORNING
Qty: 30 CAPSULE | Refills: 0 | Status: SHIPPED | OUTPATIENT
Start: 2024-10-14 | End: 2024-11-13

## 2024-10-15 ENCOUNTER — OFFICE (AMBULATORY)
Age: 34
End: 2024-10-15
Payer: COMMERCIAL

## 2024-10-15 ENCOUNTER — OFFICE (AMBULATORY)
Dept: URBAN - METROPOLITAN AREA CLINIC 64 | Facility: CLINIC | Age: 34
End: 2024-10-15
Payer: COMMERCIAL

## 2024-10-15 VITALS
DIASTOLIC BLOOD PRESSURE: 106 MMHG | DIASTOLIC BLOOD PRESSURE: 100 MMHG | WEIGHT: 217 LBS | DIASTOLIC BLOOD PRESSURE: 100 MMHG | DIASTOLIC BLOOD PRESSURE: 100 MMHG | DIASTOLIC BLOOD PRESSURE: 100 MMHG | DIASTOLIC BLOOD PRESSURE: 100 MMHG | SYSTOLIC BLOOD PRESSURE: 155 MMHG | SYSTOLIC BLOOD PRESSURE: 164 MMHG | SYSTOLIC BLOOD PRESSURE: 155 MMHG | HEIGHT: 64 IN | HEIGHT: 64 IN | HEIGHT: 64 IN | SYSTOLIC BLOOD PRESSURE: 164 MMHG | DIASTOLIC BLOOD PRESSURE: 106 MMHG | HEIGHT: 64 IN | HEART RATE: 75 BPM | SYSTOLIC BLOOD PRESSURE: 164 MMHG | HEIGHT: 64 IN | DIASTOLIC BLOOD PRESSURE: 106 MMHG | SYSTOLIC BLOOD PRESSURE: 155 MMHG | SYSTOLIC BLOOD PRESSURE: 164 MMHG | DIASTOLIC BLOOD PRESSURE: 106 MMHG | WEIGHT: 217 LBS | DIASTOLIC BLOOD PRESSURE: 106 MMHG | SYSTOLIC BLOOD PRESSURE: 155 MMHG | DIASTOLIC BLOOD PRESSURE: 106 MMHG | WEIGHT: 217 LBS | SYSTOLIC BLOOD PRESSURE: 155 MMHG | SYSTOLIC BLOOD PRESSURE: 155 MMHG | HEIGHT: 64 IN | HEART RATE: 75 BPM | HEIGHT: 64 IN | DIASTOLIC BLOOD PRESSURE: 106 MMHG | WEIGHT: 217 LBS | WEIGHT: 217 LBS | HEART RATE: 75 BPM | DIASTOLIC BLOOD PRESSURE: 100 MMHG | SYSTOLIC BLOOD PRESSURE: 164 MMHG | DIASTOLIC BLOOD PRESSURE: 100 MMHG | WEIGHT: 217 LBS | HEART RATE: 75 BPM | SYSTOLIC BLOOD PRESSURE: 155 MMHG | HEART RATE: 75 BPM | SYSTOLIC BLOOD PRESSURE: 164 MMHG | WEIGHT: 217 LBS | SYSTOLIC BLOOD PRESSURE: 164 MMHG | HEART RATE: 75 BPM | HEART RATE: 75 BPM

## 2024-10-15 DIAGNOSIS — K21.9 GASTRO-ESOPHAGEAL REFLUX DISEASE WITHOUT ESOPHAGITIS: ICD-10-CM

## 2024-10-15 DIAGNOSIS — R11.0 NAUSEA: ICD-10-CM

## 2024-10-15 PROCEDURE — 99212 OFFICE O/P EST SF 10 MIN: CPT | Performed by: NURSE PRACTITIONER

## 2024-11-09 DIAGNOSIS — F90.2 ATTENTION DEFICIT HYPERACTIVITY DISORDER (ADHD), COMBINED TYPE: ICD-10-CM

## 2024-11-11 RX ORDER — LISDEXAMFETAMINE DIMESYLATE 40 MG/1
40 CAPSULE ORAL EVERY MORNING
Qty: 30 CAPSULE | Refills: 0 | Status: SHIPPED | OUTPATIENT
Start: 2024-11-11 | End: 2024-12-11

## 2024-12-03 ENCOUNTER — OFFICE VISIT (OUTPATIENT)
Dept: FAMILY MEDICINE CLINIC | Facility: CLINIC | Age: 34
End: 2024-12-03
Payer: COMMERCIAL

## 2024-12-03 VITALS
OXYGEN SATURATION: 97 % | SYSTOLIC BLOOD PRESSURE: 138 MMHG | DIASTOLIC BLOOD PRESSURE: 88 MMHG | WEIGHT: 217 LBS | HEART RATE: 100 BPM | BODY MASS INDEX: 37.05 KG/M2 | HEIGHT: 64 IN

## 2024-12-03 DIAGNOSIS — I95.1 AUTONOMIC ORTHOSTATIC HYPOTENSION: ICD-10-CM

## 2024-12-03 DIAGNOSIS — J45.50 SEVERE PERSISTENT ASTHMA WITHOUT COMPLICATION: ICD-10-CM

## 2024-12-03 DIAGNOSIS — F90.2 ATTENTION DEFICIT HYPERACTIVITY DISORDER (ADHD), COMBINED TYPE: Primary | ICD-10-CM

## 2024-12-03 DIAGNOSIS — F32.A ANXIETY AND DEPRESSION: ICD-10-CM

## 2024-12-03 DIAGNOSIS — F41.9 ANXIETY AND DEPRESSION: ICD-10-CM

## 2024-12-03 DIAGNOSIS — Z00.00 ANNUAL PHYSICAL EXAM: ICD-10-CM

## 2024-12-03 DIAGNOSIS — K58.1 IRRITABLE BOWEL SYNDROME WITH CONSTIPATION: ICD-10-CM

## 2024-12-03 DIAGNOSIS — G93.2 IDIOPATHIC INTRACRANIAL HYPERTENSION: ICD-10-CM

## 2024-12-03 DIAGNOSIS — E28.2 PCOS (POLYCYSTIC OVARIAN SYNDROME): ICD-10-CM

## 2024-12-03 DIAGNOSIS — R73.03 PREDIABETES: ICD-10-CM

## 2024-12-03 DIAGNOSIS — L40.0 PLAQUE PSORIASIS: ICD-10-CM

## 2024-12-03 PROCEDURE — 99395 PREV VISIT EST AGE 18-39: CPT | Performed by: NURSE PRACTITIONER

## 2024-12-03 NOTE — ASSESSMENT & PLAN NOTE
Psychological condition is stable.  Continue current treatment regimen.  Psychological condition  will be reassessed in 6 months.   PAIN

## 2024-12-03 NOTE — ASSESSMENT & PLAN NOTE
Asthma is stable.  The patient is experiencing monthly daytime asthma symptoms and she is experiencing no nighttime asthma symptoms.    Plan: Continue same medication/s without change.    Discussed medication dosage, use, side effects, and goals of treatment in detail.    Discussed distinction between quick-relief and maintenance control medications.  Discussed monitoring symptoms and use of quick-relief medications and contacting provider early in the course of exacerbations.  Warning signs of respiratory distress were reviewed with the patient.     Patient Treatment Goals: symptom prevention, minimizing limitation in activity, prevention of exacerbations and use of ER/inpatient care, maintenance of optimal pulmonary function, and minimization of adverse effects of treatment.    Followup in 6 months with Pulmonology.

## 2024-12-03 NOTE — PROGRESS NOTES
Chief Complaint  Annual Exam    Subjective        Binta Frazier presents to [facility]  History of Present Illness    Patient presents for Annual Exam without Pap Smear.     Patient has plaque psoriasis, followed by [facility].  She is taking Tremfya injections.      Patient is taking Trintellix 10mg daily for depression and anxiety.  She is taking Vyvanse 40 mg daily for ADHD. Symptoms are stable, has no acute complaints.        Patient has PCOS - on OCP. Metformin stopped due to diarrhea. She reports menstrual cycles are stable.      Patient has IBS with constipation.  She does also have a gluten sensitivity. Last colonoscopy 2021, found polyps and erosion in large intestine. She is followed by I.      Patient has chronic cough r/t severe asthma with high eosinophils.  PFT completed and was normal. She is followed by Dr. Boggs,  - receiving Fasenra every 8 weeks. Patient also using Trelegy 1 puff daily.     Patient taking Nurtec PRN migraine headaches. She reports using approximately 1-2 times monthly.      Patient seen previously with complaints of persistent dizziness, intermittent swelling to bilateral hands and feet that was accompanied with discoloration and neuropathy. GISELLE was ordered and patient was referred to rheumatology.  Labs were unremarkable.  GISELLE showed normal digital pressures. Patient seen by Rheumatology but labs were inconclusive, although there were markers for an autoimmune vascular disease. Rheumatology referred to Vascular due to neuropathy, who reportedly felt that symptoms were autoimmune in nature.  She was seen again by Dr. Gupta, did not feel that symptoms/labs significant enough for treatment. Patient was evaluated by Neurology, advised that symptoms are suspicious for benign paroxysmal positional vertigo.  She was referred for vestibular evaluation, completed without improvement. Patient evaluated by Dr. Villanueva, advised she did not have  "POTS but does have orthostasis. Patient has intermittent episodes of heart racing, dizziness and near syncope. She has since been diagnosed with intracranial hypertension - now taking Diamox 500 mg BID.     Objective   Vital Signs:  /88 (BP Location: Left arm, Patient Position: Sitting, Cuff Size: Large Adult)   Pulse 100   Ht 162.6 cm (64\")   Wt 98.4 kg (217 lb)   SpO2 97%   BMI 37.25 kg/m²   Estimated body mass index is 37.25 kg/m² as calculated from the following:    Height as of this encounter: 162.6 cm (64\").    Weight as of this encounter: 98.4 kg (217 lb).        Physical Exam  Constitutional:       Appearance: Normal appearance.   HENT:      Head: Normocephalic.      Right Ear: Tympanic membrane normal.      Left Ear: Tympanic membrane normal.      Mouth/Throat:      Pharynx: Oropharynx is clear.   Cardiovascular:      Rate and Rhythm: Normal rate and regular rhythm.   Pulmonary:      Effort: Pulmonary effort is normal.      Breath sounds: Normal breath sounds.   Abdominal:      General: Abdomen is flat. Bowel sounds are normal.      Palpations: Abdomen is soft.   Musculoskeletal:         General: Normal range of motion.      Cervical back: Neck supple.      Right lower leg: No edema.      Left lower leg: No edema.   Skin:     General: Skin is warm and dry.   Neurological:      Mental Status: She is alert and oriented to person, place, and time.      Gait: Gait is intact.   Psychiatric:         Attention and Perception: Attention normal.         Mood and Affect: Mood normal.         Speech: Speech normal.        Result Review :    CMP          3/26/2024    11:48 8/16/2024    10:58   CMP   Glucose 119  78    BUN 11  9    Creatinine 0.85  0.82    EGFR 91.8  95.8    Sodium 136  140    Potassium 4.1  4.7    Chloride 102  109    Calcium 9.0  9.6    Total Protein 7.0     Albumin 4.0     Globulin 3.0     Total Bilirubin <0.2     Alkaline Phosphatase 113     AST (SGOT) 9     ALT (SGPT) 11   "   Albumin/Globulin Ratio 1.3     BUN/Creatinine Ratio 12.9  11.0    Anion Gap 12.0  9.0      CBC          3/26/2024    11:48   CBC   WBC 9.26    RBC 4.80    Hemoglobin 12.6    Hematocrit 38.6    MCV 80.4    MCH 26.3    MCHC 32.6    RDW 13.0    Platelets 404      Lipid Panel          3/26/2024    11:48   Lipid Panel   Total Cholesterol 199    Triglycerides 196    HDL Cholesterol 51    VLDL Cholesterol 34    LDL Cholesterol  114    LDL/HDL Ratio 2.13      TSH          3/26/2024    11:48   TSH   TSH 2.630      Most Recent A1C          3/26/2024    11:48   HGBA1C Most Recent   Hemoglobin A1C 5.70                Assessment and Plan   Diagnoses and all orders for this visit:    1. Attention deficit hyperactivity disorder (ADHD), combined type (Primary)  Assessment & Plan:  Psychological condition is stable.  Continue current treatment regimen.  Psychological condition  will be reassessed in 6 months.      2. Idiopathic intracranial hypertension  Assessment & Plan:  Stable on Diamox per Ophthalmology       3. PCOS (polycystic ovarian syndrome)  Assessment & Plan:  Stable on OCP      4. Autonomic orthostatic hypotension  Assessment & Plan:  Followed by Cardiology      5. Prediabetes  Assessment & Plan:  Managing with diet  Labs ordered      6. Irritable bowel syndrome with constipation  Assessment & Plan:  Stable at this time without treatment      7. Anxiety and depression  Assessment & Plan:  Patient's depression is a recurrent episode that is mild without psychosis. Depression is in partial remission and stable.    Plan:   Continue current medication therapy     Followup in 6 months.       8. Plaque psoriasis  Assessment & Plan:  Tremfya per Dermatology      9. Severe persistent asthma without complication  Assessment & Plan:  Asthma is stable.  The patient is experiencing monthly daytime asthma symptoms and she is experiencing no nighttime asthma symptoms.    Plan: Continue same medication/s without change.    Discussed  medication dosage, use, side effects, and goals of treatment in detail.    Discussed distinction between quick-relief and maintenance control medications.  Discussed monitoring symptoms and use of quick-relief medications and contacting provider early in the course of exacerbations.  Warning signs of respiratory distress were reviewed with the patient.     Patient Treatment Goals: symptom prevention, minimizing limitation in activity, prevention of exacerbations and use of ER/inpatient care, maintenance of optimal pulmonary function, and minimization of adverse effects of treatment.    Followup in 6 months with Pulmonology.              10. Annual physical exam  Assessment & Plan:  Pap smear up to date  Routine vision and dental screenings advised  Well balanced diet recommended  CDC recommends 150 minutes exercise weekly    Orders:  -     CBC (No Diff); Future  -     Comprehensive Metabolic Panel; Future  -     Hemoglobin A1c; Future  -     Lipid Panel; Future  -     TSH; Future           I spent 30 minutes caring for Binta on this date of service. This time includes time spent by me in the following activities:preparing for the visit, reviewing tests, obtaining and/or reviewing a separately obtained history, performing a medically appropriate examination and/or evaluation , counseling and educating the patient/family/caregiver, ordering medications, tests, or procedures, documenting information in the medical record, and care coordination  Follow Up   Return in about 6 months (around 6/3/2025) for ADHD.  Patient was given instructions and counseling regarding her condition or for health maintenance advice. Please see specific information pulled into the AVS if appropriate.     Counseling was given to patient for the following topics: diagnostic results, instructions for management, risk factor reductions, prognosis, patient and family education, impressions, risks and benefits of treatment options, and  importance of treatment compliance . Total time of the encounter was 25 minutes and 5 minutes was spent counseling.

## 2024-12-03 NOTE — ASSESSMENT & PLAN NOTE
Pap smear up to date  Routine vision and dental screenings advised  Well balanced diet recommended  CDC recommends 150 minutes exercise weekly

## 2024-12-04 ENCOUNTER — LAB (OUTPATIENT)
Dept: FAMILY MEDICINE CLINIC | Facility: CLINIC | Age: 34
End: 2024-12-04
Payer: COMMERCIAL

## 2024-12-04 DIAGNOSIS — Z00.00 ANNUAL PHYSICAL EXAM: ICD-10-CM

## 2024-12-04 PROCEDURE — 80050 GENERAL HEALTH PANEL: CPT | Performed by: NURSE PRACTITIONER

## 2024-12-04 PROCEDURE — 36415 COLL VENOUS BLD VENIPUNCTURE: CPT

## 2024-12-04 PROCEDURE — 83036 HEMOGLOBIN GLYCOSYLATED A1C: CPT | Performed by: NURSE PRACTITIONER

## 2024-12-04 PROCEDURE — 80061 LIPID PANEL: CPT | Performed by: NURSE PRACTITIONER

## 2024-12-05 LAB
ALBUMIN SERPL-MCNC: 3.6 G/DL (ref 3.5–5.2)
ALBUMIN/GLOB SERPL: 1.1 G/DL
ALP SERPL-CCNC: 97 U/L (ref 39–117)
ALT SERPL W P-5'-P-CCNC: 14 U/L (ref 1–33)
ANION GAP SERPL CALCULATED.3IONS-SCNC: 10.7 MMOL/L (ref 5–15)
AST SERPL-CCNC: 13 U/L (ref 1–32)
BILIRUB SERPL-MCNC: 0.2 MG/DL (ref 0–1.2)
BUN SERPL-MCNC: 8 MG/DL (ref 6–20)
BUN/CREAT SERPL: 8.5 (ref 7–25)
CALCIUM SPEC-SCNC: 8.9 MG/DL (ref 8.6–10.5)
CHLORIDE SERPL-SCNC: 110 MMOL/L (ref 98–107)
CHOLEST SERPL-MCNC: 182 MG/DL (ref 0–200)
CO2 SERPL-SCNC: 18.3 MMOL/L (ref 22–29)
CREAT SERPL-MCNC: 0.94 MG/DL (ref 0.57–1)
DEPRECATED RDW RBC AUTO: 40.7 FL (ref 37–54)
EGFRCR SERPLBLD CKD-EPI 2021: 80.8 ML/MIN/1.73
ERYTHROCYTE [DISTWIDTH] IN BLOOD BY AUTOMATED COUNT: 13.4 % (ref 12.3–15.4)
GLOBULIN UR ELPH-MCNC: 3.4 GM/DL
GLUCOSE SERPL-MCNC: 81 MG/DL (ref 65–99)
HBA1C MFR BLD: 5.6 % (ref 4.8–5.6)
HCT VFR BLD AUTO: 39.2 % (ref 34–46.6)
HDLC SERPL-MCNC: 43 MG/DL (ref 40–60)
HGB BLD-MCNC: 12.8 G/DL (ref 12–15.9)
LDLC SERPL CALC-MCNC: 117 MG/DL (ref 0–100)
LDLC/HDLC SERPL: 2.65 {RATIO}
MCH RBC QN AUTO: 27.5 PG (ref 26.6–33)
MCHC RBC AUTO-ENTMCNC: 32.7 G/DL (ref 31.5–35.7)
MCV RBC AUTO: 84.3 FL (ref 79–97)
PLATELET # BLD AUTO: 339 10*3/MM3 (ref 140–450)
PMV BLD AUTO: 9.4 FL (ref 6–12)
POTASSIUM SERPL-SCNC: 4.1 MMOL/L (ref 3.5–5.2)
PROT SERPL-MCNC: 7 G/DL (ref 6–8.5)
RBC # BLD AUTO: 4.65 10*6/MM3 (ref 3.77–5.28)
SODIUM SERPL-SCNC: 139 MMOL/L (ref 136–145)
TRIGL SERPL-MCNC: 125 MG/DL (ref 0–150)
TSH SERPL DL<=0.05 MIU/L-ACNC: 2.45 UIU/ML (ref 0.27–4.2)
VLDLC SERPL-MCNC: 22 MG/DL (ref 5–40)
WBC NRBC COR # BLD AUTO: 9.34 10*3/MM3 (ref 3.4–10.8)

## 2024-12-14 DIAGNOSIS — F90.2 ATTENTION DEFICIT HYPERACTIVITY DISORDER (ADHD), COMBINED TYPE: ICD-10-CM

## 2024-12-16 RX ORDER — LISDEXAMFETAMINE DIMESYLATE 40 MG/1
40 CAPSULE ORAL EVERY MORNING
Qty: 30 CAPSULE | Refills: 0 | Status: SHIPPED | OUTPATIENT
Start: 2024-12-16 | End: 2025-01-15

## 2025-01-12 DIAGNOSIS — F90.2 ATTENTION DEFICIT HYPERACTIVITY DISORDER (ADHD), COMBINED TYPE: ICD-10-CM

## 2025-01-13 RX ORDER — LISDEXAMFETAMINE DIMESYLATE 40 MG/1
40 CAPSULE ORAL EVERY MORNING
Qty: 30 CAPSULE | Refills: 0 | Status: SHIPPED | OUTPATIENT
Start: 2025-01-13 | End: 2025-02-12

## 2025-02-13 DIAGNOSIS — F90.2 ATTENTION DEFICIT HYPERACTIVITY DISORDER (ADHD), COMBINED TYPE: ICD-10-CM

## 2025-02-14 RX ORDER — LISDEXAMFETAMINE DIMESYLATE 40 MG/1
40 CAPSULE ORAL EVERY MORNING
Qty: 30 CAPSULE | Refills: 0 | Status: SHIPPED | OUTPATIENT
Start: 2025-02-14 | End: 2025-03-16

## 2025-02-24 RX ORDER — CLONIDINE HYDROCHLORIDE 0.1 MG/1
0.1 TABLET ORAL 2 TIMES DAILY PRN
Qty: 60 TABLET | Refills: 0 | Status: SHIPPED | OUTPATIENT
Start: 2025-02-24

## 2025-03-19 DIAGNOSIS — F90.2 ATTENTION DEFICIT HYPERACTIVITY DISORDER (ADHD), COMBINED TYPE: Primary | ICD-10-CM

## 2025-03-19 RX ORDER — LISDEXAMFETAMINE DIMESYLATE 30 MG/1
30 CAPSULE ORAL EVERY MORNING
Qty: 30 CAPSULE | Refills: 0 | Status: SHIPPED | OUTPATIENT
Start: 2025-03-19

## 2025-04-03 RX ORDER — CLONIDINE HYDROCHLORIDE 0.1 MG/1
TABLET ORAL
Qty: 60 TABLET | Refills: 0 | Status: SHIPPED | OUTPATIENT
Start: 2025-04-03

## 2025-04-08 ENCOUNTER — OFFICE (AMBULATORY)
Dept: URBAN - METROPOLITAN AREA CLINIC 64 | Facility: CLINIC | Age: 35
End: 2025-04-08

## 2025-04-08 VITALS
DIASTOLIC BLOOD PRESSURE: 99 MMHG | SYSTOLIC BLOOD PRESSURE: 150 MMHG | DIASTOLIC BLOOD PRESSURE: 112 MMHG | HEART RATE: 80 BPM | HEIGHT: 64 IN | WEIGHT: 218 LBS | SYSTOLIC BLOOD PRESSURE: 160 MMHG

## 2025-04-08 DIAGNOSIS — K21.9 GASTRO-ESOPHAGEAL REFLUX DISEASE WITHOUT ESOPHAGITIS: ICD-10-CM

## 2025-04-08 DIAGNOSIS — K59.04 CHRONIC IDIOPATHIC CONSTIPATION: ICD-10-CM

## 2025-04-08 DIAGNOSIS — R14.0 ABDOMINAL DISTENSION (GASEOUS): ICD-10-CM

## 2025-04-08 PROCEDURE — 99213 OFFICE O/P EST LOW 20 MIN: CPT | Performed by: NURSE PRACTITIONER

## 2025-04-08 RX ORDER — LINACLOTIDE 72 UG/1
CAPSULE, GELATIN COATED ORAL
Qty: 90 | Refills: 3 | Status: ACTIVE
Start: 2025-04-08

## 2025-04-14 DIAGNOSIS — F90.2 ATTENTION DEFICIT HYPERACTIVITY DISORDER (ADHD), COMBINED TYPE: ICD-10-CM

## 2025-04-15 RX ORDER — LISDEXAMFETAMINE DIMESYLATE 30 MG/1
30 CAPSULE ORAL EVERY MORNING
Qty: 30 CAPSULE | Refills: 0 | Status: SHIPPED | OUTPATIENT
Start: 2025-04-15

## 2025-04-21 DIAGNOSIS — F90.2 ATTENTION DEFICIT HYPERACTIVITY DISORDER (ADHD), COMBINED TYPE: ICD-10-CM

## 2025-04-22 RX ORDER — LISDEXAMFETAMINE DIMESYLATE 30 MG/1
30 CAPSULE ORAL EVERY MORNING
Qty: 30 CAPSULE | Refills: 0 | OUTPATIENT
Start: 2025-04-22

## 2025-05-17 DIAGNOSIS — F90.2 ATTENTION DEFICIT HYPERACTIVITY DISORDER (ADHD), COMBINED TYPE: ICD-10-CM

## 2025-05-19 RX ORDER — LISDEXAMFETAMINE DIMESYLATE 30 MG/1
30 CAPSULE ORAL EVERY MORNING
Qty: 30 CAPSULE | Refills: 0 | Status: SHIPPED | OUTPATIENT
Start: 2025-05-19

## 2025-06-02 RX ORDER — NORGESTIMATE AND ETHINYL ESTRADIOL
1 KIT DAILY
Qty: 28 TABLET | Refills: 12 | Status: SHIPPED | OUTPATIENT
Start: 2025-06-02

## 2025-06-03 ENCOUNTER — OFFICE VISIT (OUTPATIENT)
Dept: FAMILY MEDICINE CLINIC | Facility: CLINIC | Age: 35
End: 2025-06-03
Payer: COMMERCIAL

## 2025-06-03 VITALS
WEIGHT: 215 LBS | SYSTOLIC BLOOD PRESSURE: 132 MMHG | OXYGEN SATURATION: 99 % | BODY MASS INDEX: 36.7 KG/M2 | HEART RATE: 83 BPM | HEIGHT: 64 IN | DIASTOLIC BLOOD PRESSURE: 90 MMHG

## 2025-06-03 DIAGNOSIS — G93.2 IDIOPATHIC INTRACRANIAL HYPERTENSION: ICD-10-CM

## 2025-06-03 DIAGNOSIS — G43.009 MIGRAINE WITHOUT AURA AND WITHOUT STATUS MIGRAINOSUS, NOT INTRACTABLE: ICD-10-CM

## 2025-06-03 DIAGNOSIS — F90.2 ATTENTION DEFICIT HYPERACTIVITY DISORDER (ADHD), COMBINED TYPE: ICD-10-CM

## 2025-06-03 DIAGNOSIS — L40.0 PLAQUE PSORIASIS: Primary | ICD-10-CM

## 2025-06-03 DIAGNOSIS — F41.9 ANXIETY AND DEPRESSION: ICD-10-CM

## 2025-06-03 DIAGNOSIS — E28.2 PCOS (POLYCYSTIC OVARIAN SYNDROME): ICD-10-CM

## 2025-06-03 DIAGNOSIS — J45.50 SEVERE PERSISTENT ASTHMA WITHOUT COMPLICATION: ICD-10-CM

## 2025-06-03 DIAGNOSIS — F32.A ANXIETY AND DEPRESSION: ICD-10-CM

## 2025-06-03 DIAGNOSIS — K58.1 IRRITABLE BOWEL SYNDROME WITH CONSTIPATION: ICD-10-CM

## 2025-06-03 PROCEDURE — 99214 OFFICE O/P EST MOD 30 MIN: CPT | Performed by: NURSE PRACTITIONER

## 2025-06-03 RX ORDER — ATOGEPANT 30 MG/1
TABLET ORAL
COMMUNITY
Start: 2025-03-04

## 2025-06-03 RX ORDER — GALCANEZUMAB 120 MG/ML
INJECTION, SOLUTION SUBCUTANEOUS
COMMUNITY
Start: 2025-03-03

## 2025-06-03 NOTE — PROGRESS NOTES
Chief Complaint  Follow-up (6 month follow up ADHD )    Chance Frazier presents to [facility]  History of Present Illness    Patient presents for f/u visit.     Patient has plaque psoriasis, followed by [facility].  She is taking Tremfya injections.       Patient is taking Trintellix 10mg daily for depression and anxiety.  She is taking Vyvanse 30 mg daily for ADHD. Symptoms are stable, has no acute complaints.        Patient has PCOS - on OCP. Metformin stopped due to diarrhea. She reports menstrual cycles are stable on OCP.     Patient has IBS with constipation.  She does also have a gluten sensitivity. Last colonoscopy 2021, found polyps and erosion in large intestine. She is followed by I.      Patient has chronic cough r/t severe asthma with high eosinophils.  PFT completed and was normal. She is followed by Dr. Boggs,  - receiving Fasenra every 8 weeks. Patient also using Trelegy 1 puff daily.      Patient taking Qulipta 30 mg daily, Emgality monthly and Nurtec PRN migraine headaches. She reports migraine headaches improving, but is experiencing hair loss.      Patient seen previously with complaints of persistent dizziness, intermittent swelling to bilateral hands and feet that was accompanied with discoloration and neuropathy. GISELLE was ordered and patient was referred to rheumatology.  Labs were unremarkable.  GISELLE showed normal digital pressures. Patient seen by Rheumatology but labs were inconclusive, although there were markers for an autoimmune vascular disease. Rheumatology referred to Vascular due to neuropathy, who reportedly felt that symptoms were autoimmune in nature.  She was seen again by Dr. Gupta, did not feel that symptoms/labs significant enough for treatment. Patient was evaluated by Neurology, advised that symptoms are suspicious for benign paroxysmal positional vertigo.  She was referred for vestibular evaluation, completed  "without improvement. Patient evaluated by Dr. Villanueva, advised she did not have POTS but does have orthostasis. Patient has intermittent episodes of heart racing, dizziness and near syncope. She has since been diagnosed with intracranial hypertension -  Diamox 500 mg BID used but stopped in December due to improvements in pressure.  She does take Clonidine PRN - higher readings since started new migraine treatments.        Objective   Vital Signs:  /90 (BP Location: Left arm, Patient Position: Sitting, Cuff Size: Adult)   Pulse 83   Ht 162.6 cm (64\")   Wt 97.5 kg (215 lb)   SpO2 99%   BMI 36.90 kg/m²   Estimated body mass index is 36.9 kg/m² as calculated from the following:    Height as of this encounter: 162.6 cm (64\").    Weight as of this encounter: 97.5 kg (215 lb).            Physical Exam  Constitutional:       Appearance: Normal appearance.   HENT:      Head: Normocephalic.   Cardiovascular:      Rate and Rhythm: Normal rate and regular rhythm.   Pulmonary:      Effort: Pulmonary effort is normal.      Breath sounds: Normal breath sounds.   Abdominal:      General: Abdomen is flat. Bowel sounds are normal.      Palpations: Abdomen is soft.   Musculoskeletal:         General: Normal range of motion.      Cervical back: Neck supple.      Right lower leg: No edema.      Left lower leg: No edema.   Skin:     General: Skin is warm and dry.   Neurological:      Mental Status: She is alert and oriented to person, place, and time.      Gait: Gait is intact.   Psychiatric:         Attention and Perception: Attention normal.         Mood and Affect: Mood normal.         Speech: Speech normal.        Result Review :    CMP          8/16/2024    10:58 12/4/2024    08:30   CMP   Glucose 78  81    BUN 9  8    Creatinine 0.82  0.94    EGFR 95.8  80.8    Sodium 140  139    Potassium 4.7  4.1    Chloride 109  110    Calcium 9.6  8.9    Total Protein  7.0    Albumin  3.6    Globulin  3.4    Total Bilirubin  0.2  "   Alkaline Phosphatase  97    AST (SGOT)  13    ALT (SGPT)  14    Albumin/Globulin Ratio  1.1    BUN/Creatinine Ratio 11.0  8.5    Anion Gap 9.0  10.7      CBC          12/4/2024    08:30   CBC   WBC 9.34    RBC 4.65    Hemoglobin 12.8    Hematocrit 39.2    MCV 84.3    MCH 27.5    MCHC 32.7    RDW 13.4    Platelets 339      Lipid Panel          12/4/2024    08:30   Lipid Panel   Total Cholesterol 182    Triglycerides 125    HDL Cholesterol 43    VLDL Cholesterol 22    LDL Cholesterol  117    LDL/HDL Ratio 2.65      TSH          12/4/2024    08:30   TSH   TSH 2.450      Most Recent A1C          12/4/2024    08:30   HGBA1C Most Recent   Hemoglobin A1C 5.60      Data reviewed : Consultant notes Neurology/Pulmonology          Assessment and Plan   Diagnoses and all orders for this visit:    1. Plaque psoriasis (Primary)  Assessment & Plan:  Tremfya per Dermatology      2. Severe persistent asthma without complication  Assessment & Plan:  Asthma is stable.  The patient is experiencing monthly daytime asthma symptoms and she is experiencing no nighttime asthma symptoms.    Plan: Continue same medication/s without change.    Discussed medication dosage, use, side effects, and goals of treatment in detail.    Discussed distinction between quick-relief and maintenance control medications.  Discussed monitoring symptoms and use of quick-relief medications and contacting provider early in the course of exacerbations.  Warning signs of respiratory distress were reviewed with the patient.     Patient Treatment Goals: symptom prevention, minimizing limitation in activity, prevention of exacerbations and use of ER/inpatient care, maintenance of optimal pulmonary function, and minimization of adverse effects of treatment.    Followup at the next regular appointment.              3. Migraine without aura and without status migrainosus, not intractable  Assessment & Plan:  Headaches are stable.    Plan:  Continue same medication/s  without change.   and discuss side effects with Neuro.     Discussed medication dosage, use, side effects, and goals of treatment in detail.    Discussed monitoring symptoms and use of quick-relief medications and maintenance medication.    General Treatment Goals:   symptom prevention  minimize work absence  minimizing limitation in activity  prevention of exacerbations  decrease use of ER/inpatient care  minimization of adverse effects of treatment    Followup at the next regular appointment                  4. Anxiety and depression  Assessment & Plan:  Patient's depression is a recurrent episode that is mild without psychosis. Depression is in partial remission and stable.    Plan:   Continue current medication therapy     Followup at the next regular appointment.       5. Irritable bowel syndrome with constipation  Assessment & Plan:  Stable with diet      6. PCOS (polycystic ovarian syndrome)  Assessment & Plan:  Stable on OCP      7. Attention deficit hyperactivity disorder (ADHD), combined type  Assessment & Plan:  Psychological condition is stable.  Continue current treatment regimen.  Psychological condition  will be reassessed at the next regular appointment.      8. Idiopathic intracranial hypertension  Assessment & Plan:  Stable, followed by Ophthalmology             I spent 30 minutes caring for Binta on this date of service. This time includes time spent by me in the following activities:preparing for the visit, reviewing tests, obtaining and/or reviewing a separately obtained history, performing a medically appropriate examination and/or evaluation , counseling and educating the patient/family/caregiver, ordering medications, tests, or procedures, documenting information in the medical record, and care coordination  Follow Up   Return in about 4 months (around 10/3/2025) for ADHD/Depression.  Patient was given instructions and counseling regarding her condition or for health maintenance advice.  Please see specific information pulled into the AVS if appropriate.

## 2025-06-03 NOTE — ASSESSMENT & PLAN NOTE
Headaches are stable.    Plan:  Continue same medication/s without change.   and discuss side effects with Neuro.     Discussed medication dosage, use, side effects, and goals of treatment in detail.    Discussed monitoring symptoms and use of quick-relief medications and maintenance medication.    General Treatment Goals:   symptom prevention  minimize work absence  minimizing limitation in activity  prevention of exacerbations  decrease use of ER/inpatient care  minimization of adverse effects of treatment    Followup at the next regular appointment

## 2025-06-19 DIAGNOSIS — F90.2 ATTENTION DEFICIT HYPERACTIVITY DISORDER (ADHD), COMBINED TYPE: ICD-10-CM

## 2025-06-19 RX ORDER — LISDEXAMFETAMINE DIMESYLATE 30 MG/1
30 CAPSULE ORAL EVERY MORNING
Qty: 30 CAPSULE | Refills: 0 | Status: SHIPPED | OUTPATIENT
Start: 2025-06-19

## 2025-06-19 RX ORDER — CLONIDINE HYDROCHLORIDE 0.1 MG/1
0.1 TABLET ORAL 2 TIMES DAILY
Qty: 60 TABLET | Refills: 0 | Status: SHIPPED | OUTPATIENT
Start: 2025-06-19

## 2025-07-23 DIAGNOSIS — F90.2 ATTENTION DEFICIT HYPERACTIVITY DISORDER (ADHD), COMBINED TYPE: ICD-10-CM

## 2025-07-23 RX ORDER — LISDEXAMFETAMINE DIMESYLATE 30 MG/1
30 CAPSULE ORAL EVERY MORNING
Qty: 30 CAPSULE | Refills: 0 | Status: SHIPPED | OUTPATIENT
Start: 2025-07-23

## 2025-07-23 NOTE — TELEPHONE ENCOUNTER
Caller: Binta Frazier    Relationship: Self    Best call back number:     [phone] (Home)       Requested Prescriptions:   Requested Prescriptions     Pending Prescriptions Disp Refills    lisdexamfetamine (Vyvanse) 30 MG capsule 30 capsule 0     Sig: Take 1 capsule by mouth Every Morning        Pharmacy where request should be sent: [facility] [id number] [address] - [phone]  - [phone] FX     Last office visit with prescribing clinician: 6/3/2025   Last telemedicine visit with prescribing clinician: Visit date not found   Next office visit with prescribing clinician: 10/14/2025     Additional details provided by patient: PATIENT WILL BE OUT AFTER TODAY     Does the patient have less than a 3 day supply:  [x] Yes  [] No    Would you like a call back once the refill request has been completed: [] Yes [x] No    If the office needs to give you a call back, can they leave a voicemail: [] Yes [x] No    Sylvia Tucker Rep   07/23/25 08:12 EDT

## 2025-07-29 RX ORDER — CLONIDINE HYDROCHLORIDE 0.1 MG/1
0.1 TABLET ORAL 2 TIMES DAILY
Qty: 60 TABLET | Refills: 0 | Status: SHIPPED | OUTPATIENT
Start: 2025-07-29

## 2025-07-29 NOTE — TELEPHONE ENCOUNTER
Caller: Binta Frazier    Relationship: Self    Best call back number: [phone]    Requested Prescriptions:   Requested Prescriptions     Pending Prescriptions Disp Refills    cloNIDine (CATAPRES) 0.1 MG tablet 60 tablet 0     Sig: Take 1 tablet by mouth 2 (Two) Times a Day.    Vortioxetine HBr (Trintellix) 10 MG tablet tablet 90 tablet 1     Sig: Take 1 tablet by mouth Daily With Breakfast.        Pharmacy where request should be sent: [facility] [id number] [address] - [phone]  - [phone] FX     Last office visit with prescribing clinician: 6/3/2025   Last telemedicine visit with prescribing clinician: Visit date not found   Next office visit with prescribing clinician: 10/14/2025     Additional details provided by patient: PATIENT NEEDS REFILL     Does the patient have less than a 3 day supply:  [x] Yes  [] No    Would you like a call back once the refill request has been completed: [] Yes [x] No    If the office needs to give you a call back, can they leave a voicemail: [] Yes [x] No    Sylvia Soto   07/29/25 12:05 EDT

## 2025-07-31 RX ORDER — VORTIOXETINE 10 MG/1
10 TABLET, FILM COATED ORAL
Qty: 90 TABLET | Refills: 1 | OUTPATIENT
Start: 2025-07-31

## 2025-08-19 DIAGNOSIS — F90.2 ATTENTION DEFICIT HYPERACTIVITY DISORDER (ADHD), COMBINED TYPE: ICD-10-CM

## 2025-08-20 RX ORDER — LISDEXAMFETAMINE DIMESYLATE 30 MG/1
30 CAPSULE ORAL EVERY MORNING
Qty: 30 CAPSULE | Refills: 0 | Status: SHIPPED | OUTPATIENT
Start: 2025-08-20

## 2025-08-27 RX ORDER — CLONIDINE HYDROCHLORIDE 0.1 MG/1
0.1 TABLET ORAL 2 TIMES DAILY
Qty: 60 TABLET | Refills: 0 | Status: SHIPPED | OUTPATIENT
Start: 2025-08-27